# Patient Record
Sex: FEMALE | Race: ASIAN | NOT HISPANIC OR LATINO | Employment: FULL TIME | URBAN - METROPOLITAN AREA
[De-identification: names, ages, dates, MRNs, and addresses within clinical notes are randomized per-mention and may not be internally consistent; named-entity substitution may affect disease eponyms.]

---

## 2023-05-23 ENCOUNTER — OFFICE VISIT (OUTPATIENT)
Dept: PRIMARY CARE | Facility: CLINIC | Age: 28
End: 2023-05-23
Payer: COMMERCIAL

## 2023-05-23 VITALS
WEIGHT: 205 LBS | SYSTOLIC BLOOD PRESSURE: 130 MMHG | DIASTOLIC BLOOD PRESSURE: 82 MMHG | HEART RATE: 115 BPM | OXYGEN SATURATION: 98 %

## 2023-05-23 DIAGNOSIS — M25.59 PAIN IN OTHER JOINT: Primary | ICD-10-CM

## 2023-05-23 DIAGNOSIS — Z13.29 THYROID DISORDER SCREENING: ICD-10-CM

## 2023-05-23 DIAGNOSIS — J45.20 MILD INTERMITTENT EXTRINSIC ASTHMA WITHOUT COMPLICATION (HHS-HCC): ICD-10-CM

## 2023-05-23 DIAGNOSIS — F41.9 ANXIETY AND DEPRESSION: ICD-10-CM

## 2023-05-23 DIAGNOSIS — R73.9 HYPERGLYCEMIA: ICD-10-CM

## 2023-05-23 DIAGNOSIS — Z00.00 HEALTH CARE MAINTENANCE: ICD-10-CM

## 2023-05-23 DIAGNOSIS — F32.A ANXIETY AND DEPRESSION: ICD-10-CM

## 2023-05-23 DIAGNOSIS — F90.0 ATTENTION DEFICIT HYPERACTIVITY DISORDER (ADHD), PREDOMINANTLY INATTENTIVE TYPE: ICD-10-CM

## 2023-05-23 DIAGNOSIS — Z23 ENCOUNTER FOR IMMUNIZATION: ICD-10-CM

## 2023-05-23 DIAGNOSIS — M79.10 MYALGIA: ICD-10-CM

## 2023-05-23 LAB
ALANINE AMINOTRANSFERASE (SGPT) (U/L) IN SER/PLAS: 22 U/L (ref 7–45)
ALBUMIN (G/DL) IN SER/PLAS: 4.6 G/DL (ref 3.4–5)
ALKALINE PHOSPHATASE (U/L) IN SER/PLAS: 45 U/L (ref 33–110)
ANION GAP IN SER/PLAS: 15 MMOL/L (ref 10–20)
ASPARTATE AMINOTRANSFERASE (SGOT) (U/L) IN SER/PLAS: 17 U/L (ref 9–39)
BILIRUBIN TOTAL (MG/DL) IN SER/PLAS: 0.2 MG/DL (ref 0–1.2)
C REACTIVE PROTEIN (MG/L) IN SER/PLAS: 0.29 MG/DL
CALCIUM (MG/DL) IN SER/PLAS: 9.7 MG/DL (ref 8.6–10.6)
CARBON DIOXIDE, TOTAL (MMOL/L) IN SER/PLAS: 26 MMOL/L (ref 21–32)
CHLORIDE (MMOL/L) IN SER/PLAS: 101 MMOL/L (ref 98–107)
CREATININE (MG/DL) IN SER/PLAS: 0.71 MG/DL (ref 0.5–1.05)
ESTIMATED AVERAGE GLUCOSE FOR HBA1C: 105 MG/DL
GFR FEMALE: >90 ML/MIN/1.73M2
GLUCOSE (MG/DL) IN SER/PLAS: 116 MG/DL (ref 74–99)
HEMOGLOBIN A1C/HEMOGLOBIN TOTAL IN BLOOD: 5.3 %
IRON (UG/DL) IN SER/PLAS: 47 UG/DL (ref 35–150)
IRON BINDING CAPACITY (UG/DL) IN SER/PLAS: 476 UG/DL (ref 240–445)
IRON SATURATION (%) IN SER/PLAS: 10 % (ref 25–45)
POTASSIUM (MMOL/L) IN SER/PLAS: 4 MMOL/L (ref 3.5–5.3)
PROTEIN TOTAL: 7.8 G/DL (ref 6.4–8.2)
SODIUM (MMOL/L) IN SER/PLAS: 138 MMOL/L (ref 136–145)
THYROTROPIN (MIU/L) IN SER/PLAS BY DETECTION LIMIT <= 0.05 MIU/L: 2.13 MIU/L (ref 0.44–3.98)
UREA NITROGEN (MG/DL) IN SER/PLAS: 12 MG/DL (ref 6–23)

## 2023-05-23 PROCEDURE — 1036F TOBACCO NON-USER: CPT | Performed by: STUDENT IN AN ORGANIZED HEALTH CARE EDUCATION/TRAINING PROGRAM

## 2023-05-23 PROCEDURE — 83540 ASSAY OF IRON: CPT

## 2023-05-23 PROCEDURE — 84443 ASSAY THYROID STIM HORMONE: CPT

## 2023-05-23 PROCEDURE — 85027 COMPLETE CBC AUTOMATED: CPT

## 2023-05-23 PROCEDURE — 86038 ANTINUCLEAR ANTIBODIES: CPT

## 2023-05-23 PROCEDURE — 90471 IMMUNIZATION ADMIN: CPT | Performed by: STUDENT IN AN ORGANIZED HEALTH CARE EDUCATION/TRAINING PROGRAM

## 2023-05-23 PROCEDURE — 99204 OFFICE O/P NEW MOD 45 MIN: CPT | Performed by: STUDENT IN AN ORGANIZED HEALTH CARE EDUCATION/TRAINING PROGRAM

## 2023-05-23 PROCEDURE — 85652 RBC SED RATE AUTOMATED: CPT

## 2023-05-23 PROCEDURE — 80053 COMPREHEN METABOLIC PANEL: CPT

## 2023-05-23 PROCEDURE — 83036 HEMOGLOBIN GLYCOSYLATED A1C: CPT

## 2023-05-23 PROCEDURE — 90715 TDAP VACCINE 7 YRS/> IM: CPT | Performed by: STUDENT IN AN ORGANIZED HEALTH CARE EDUCATION/TRAINING PROGRAM

## 2023-05-23 PROCEDURE — 83550 IRON BINDING TEST: CPT

## 2023-05-23 PROCEDURE — 86140 C-REACTIVE PROTEIN: CPT

## 2023-05-23 RX ORDER — HYDROXYZINE PAMOATE 25 MG/1
CAPSULE ORAL
COMMUNITY
Start: 2023-04-30 | End: 2023-06-07 | Stop reason: SDUPTHER

## 2023-05-23 RX ORDER — SERTRALINE HYDROCHLORIDE 100 MG/1
100 TABLET, FILM COATED ORAL DAILY
COMMUNITY
Start: 2023-04-30 | End: 2023-06-07 | Stop reason: SDUPTHER

## 2023-05-23 RX ORDER — DEXTROAMPHETAMINE SACCHARATE, AMPHETAMINE ASPARTATE, DEXTROAMPHETAMINE SULFATE AND AMPHETAMINE SULFATE 5; 5; 5; 5 MG/1; MG/1; MG/1; MG/1
20 TABLET ORAL 2 TIMES DAILY
COMMUNITY
Start: 2023-05-05 | End: 2023-06-01 | Stop reason: SDUPTHER

## 2023-05-23 RX ORDER — ALBUTEROL SULFATE 90 UG/1
AEROSOL, METERED RESPIRATORY (INHALATION)
COMMUNITY

## 2023-05-23 RX ORDER — MONTELUKAST SODIUM 10 MG/1
10 TABLET ORAL NIGHTLY
Qty: 30 TABLET | Refills: 5 | Status: SHIPPED | OUTPATIENT
Start: 2023-05-23 | End: 2023-11-28 | Stop reason: SDUPTHER

## 2023-05-23 ASSESSMENT — PATIENT HEALTH QUESTIONNAIRE - PHQ9
SUM OF ALL RESPONSES TO PHQ9 QUESTIONS 1 AND 2: 0
2. FEELING DOWN, DEPRESSED OR HOPELESS: NOT AT ALL
1. LITTLE INTEREST OR PLEASURE IN DOING THINGS: NOT AT ALL

## 2023-05-23 NOTE — PROGRESS NOTES
Subjective   Patient ID: Ania Claire is a 27 y.o. female who presents for Establish Care (New patient est care/Muscle/joint pains, states she is asthmatic/Remembers that when she was a child she was told she had mild scoliosis).  Establish care.     Recently diagnosed with ADHD. Had symptoms throughout childhood but was not previously tested. Started adderall in January, has been doing very well with this. Takes 20mg twice daily. Sweats more but no other side effects. Says this is tolerable.      Generalized muscle and body aches. Has been going on for 9-10 years. Slightly worse. Sleep and travel worsen this. No prior work up for this.     Back and shoulder muscles. Hot baths and biofreeze help.     Has Mirena IUD, has significantly improved her cramps and bleeding. 2021 normal pap.     Normal STI testing one month ago. Gets screened regularly.     No significant family hx of rheumatologist disease.     Long hx of poor sleep.     Was previously on lexapro. Did not help her anxiety. Now is on zoloft 100mg. Anxiety is still moderate.     Paternal grandmother  from colon cancer. Strong family hx of mental illness.     Had PFTs 10 years ago. Uses her albuterol less than 1x per week.         Review of Systems   Constitutional:  Positive for fatigue. Negative for activity change and unexpected weight change.   Eyes:  Negative for visual disturbance.   Respiratory:  Positive for cough and wheezing.    Cardiovascular:  Negative for chest pain, palpitations and leg swelling.   Genitourinary:  Negative for menstrual problem.   Musculoskeletal:  Positive for arthralgias and myalgias.   Neurological:  Negative for dizziness, light-headedness and headaches.   Psychiatric/Behavioral:  Positive for sleep disturbance.    All other systems reviewed and are negative.      Objective   Physical Exam  Vitals reviewed.   Constitutional:       General: She is not in acute distress.  HENT:      Head: Normocephalic.       Right Ear: External ear normal.      Left Ear: External ear normal.      Nose: Nose normal.   Eyes:      Extraocular Movements: Extraocular movements intact.      Pupils: Pupils are equal, round, and reactive to light.   Cardiovascular:      Rate and Rhythm: Normal rate and regular rhythm.      Heart sounds: Normal heart sounds.   Pulmonary:      Effort: Pulmonary effort is normal.      Breath sounds: Normal breath sounds.   Musculoskeletal:      Cervical back: Normal range of motion and neck supple.   Skin:     General: Skin is warm and dry.   Neurological:      Mental Status: She is alert. Mental status is at baseline.   Psychiatric:         Mood and Affect: Mood normal.         Behavior: Behavior normal.           Current Outpatient Medications:     albuterol 90 mcg/actuation inhaler, INHALE 2 PUFFS EVERY 4 TO 6 HOURS AS NEEDED FOR SHORTNESS OF BREATH OR WHEEZING, Disp: , Rfl:     amphetamine-dextroamphetamine (Adderall) 20 mg tablet, Take 1 tablet (20 mg) by mouth 2 times a day., Disp: , Rfl:     hydrOXYzine pamoate (Vistaril) 25 mg capsule, TAKE ONE CAPSULE BY MOUTH THREE TIMES A DAY AS NEEDED FOR ANXIETY, Disp: , Rfl:     sertraline (Zoloft) 100 mg tablet, Take 1 tablet (100 mg) by mouth once daily., Disp: , Rfl:     montelukast (Singulair) 10 mg tablet, Take 1 tablet (10 mg) by mouth once daily at bedtime., Disp: 30 tablet, Rfl: 5      Assessment/Plan   Diagnoses and all orders for this visit:  Pain in other joint  -     Sedimentation Rate  -     C-reactive protein  -     JAYLON  -     Iron and TIBC  Myalgia  -     Sedimentation Rate  -     C-reactive protein  -     JAYLON  Thyroid disorder screening  -     TSH with reflex to Free T4 if abnormal  Hyperglycemia  -     Hemoglobin A1c  Health care maintenance  Comments:  up to date on paps  Orders:  -     CBC  -     Comprehensive metabolic panel  Encounter for immunization  -     Tdap vaccine, age 10 years and older (ADACEL)  Mild intermittent extrinsic asthma  without complication  Comments:  PFTs 10 years ago  rare albuterol use  add singulair due to allergy related triggers  Orders:  -     montelukast (Singulair) 10 mg tablet; Take 1 tablet (10 mg) by mouth once daily at bedtime.  Attention deficit hyperactivity disorder (ADHD), predominantly inattentive type  Comments:  doing well on 20mg adderall BID  requested psychiatry records  Anxiety and depression  Comments:  anxiety not fully controlled  depression is doing well  discussed can consider going up on zoloft dose if interested  Sees counselor every 2 weeks    Follow up in 3 months    Zarina Grigsby, DO

## 2023-05-24 PROBLEM — N92.1 BREAKTHROUGH BLEEDING WITH IUD: Status: ACTIVE | Noted: 2021-07-20

## 2023-05-24 PROBLEM — N92.0 MENORRHAGIA WITH REGULAR CYCLE: Status: ACTIVE | Noted: 2021-06-01

## 2023-05-24 PROBLEM — Z97.5 BREAKTHROUGH BLEEDING WITH IUD: Status: ACTIVE | Noted: 2021-07-20

## 2023-05-24 PROBLEM — R06.2 WHEEZING: Status: ACTIVE | Noted: 2018-07-18

## 2023-05-24 LAB
ANTI-NUCLEAR ANTIBODY (ANA): NEGATIVE
ERYTHROCYTE DISTRIBUTION WIDTH (RATIO) BY AUTOMATED COUNT: 12.8 % (ref 11.5–14.5)
ERYTHROCYTE MEAN CORPUSCULAR HEMOGLOBIN CONCENTRATION (G/DL) BY AUTOMATED: 30.6 G/DL (ref 32–36)
ERYTHROCYTE MEAN CORPUSCULAR VOLUME (FL) BY AUTOMATED COUNT: 92 FL (ref 80–100)
ERYTHROCYTES (10*6/UL) IN BLOOD BY AUTOMATED COUNT: 4.58 X10E12/L (ref 4–5.2)
HEMATOCRIT (%) IN BLOOD BY AUTOMATED COUNT: 42.1 % (ref 36–46)
HEMOGLOBIN (G/DL) IN BLOOD: 12.9 G/DL (ref 12–16)
LEUKOCYTES (10*3/UL) IN BLOOD BY AUTOMATED COUNT: 7.4 X10E9/L (ref 4.4–11.3)
NRBC (PER 100 WBCS) BY AUTOMATED COUNT: 0 /100 WBC (ref 0–0)
PLATELETS (10*3/UL) IN BLOOD AUTOMATED COUNT: 353 X10E9/L (ref 150–450)
SEDIMENTATION RATE, ERYTHROCYTE: 15 MM/H (ref 0–20)

## 2023-05-24 ASSESSMENT — ENCOUNTER SYMPTOMS
ACTIVITY CHANGE: 0
FATIGUE: 1
COUGH: 1
ARTHRALGIAS: 1
MYALGIAS: 1
UNEXPECTED WEIGHT CHANGE: 0
WHEEZING: 1
HEADACHES: 0
PALPITATIONS: 0
DIZZINESS: 0
SLEEP DISTURBANCE: 1
LIGHT-HEADEDNESS: 0

## 2023-05-31 ENCOUNTER — CLINICAL SUPPORT (OUTPATIENT)
Dept: PRIMARY CARE | Facility: CLINIC | Age: 28
End: 2023-05-31
Payer: COMMERCIAL

## 2023-05-31 DIAGNOSIS — Z91.89 COMPLIANCE WITH MEDICATION REGIMEN: ICD-10-CM

## 2023-05-31 DIAGNOSIS — Z91.158: ICD-10-CM

## 2023-05-31 PROCEDURE — 80349 CANNABINOIDS NATURAL: CPT

## 2023-05-31 PROCEDURE — 80361 OPIATES 1 OR MORE: CPT

## 2023-05-31 PROCEDURE — 80365 DRUG SCREENING OXYCODONE: CPT

## 2023-05-31 PROCEDURE — 80373 DRUG SCREENING TRAMADOL: CPT

## 2023-05-31 PROCEDURE — 80368 SEDATIVE HYPNOTICS: CPT

## 2023-05-31 PROCEDURE — 80324 DRUG SCREEN AMPHETAMINES 1/2: CPT

## 2023-05-31 PROCEDURE — 80346 BENZODIAZEPINES1-12: CPT

## 2023-05-31 PROCEDURE — 80358 DRUG SCREENING METHADONE: CPT

## 2023-05-31 PROCEDURE — 80354 DRUG SCREENING FENTANYL: CPT

## 2023-05-31 PROCEDURE — 80307 DRUG TEST PRSMV CHEM ANLYZR: CPT

## 2023-06-01 ENCOUNTER — PATIENT MESSAGE (OUTPATIENT)
Dept: PRIMARY CARE | Facility: CLINIC | Age: 28
End: 2023-06-01

## 2023-06-01 DIAGNOSIS — F90.0 ATTENTION DEFICIT HYPERACTIVITY DISORDER (ADHD), PREDOMINANTLY INATTENTIVE TYPE: Primary | ICD-10-CM

## 2023-06-01 RX ORDER — DEXTROAMPHETAMINE SACCHARATE, AMPHETAMINE ASPARTATE, DEXTROAMPHETAMINE SULFATE AND AMPHETAMINE SULFATE 5; 5; 5; 5 MG/1; MG/1; MG/1; MG/1
20 TABLET ORAL 2 TIMES DAILY
Qty: 60 TABLET | Refills: 0 | Status: SHIPPED | OUTPATIENT
Start: 2023-06-01 | End: 2023-07-13 | Stop reason: SDUPTHER

## 2023-06-02 LAB
6-ACETYLMORPHINE: <25 NG/ML
7-AMINOCLONAZEPAM: <25 NG/ML
ALPHA-HYDROXYALPRAZOLAM: <25 NG/ML
ALPHA-HYDROXYMIDAZOLAM: <25 NG/ML
ALPRAZOLAM: <25 NG/ML
AMPHETAMINE (PRESENCE) IN URINE BY SCREEN METHOD: ABNORMAL
BARBITURATES PRESENCE IN URINE BY SCREEN METHOD: ABNORMAL
CANNABINOIDS IN URINE BY SCREEN METHOD: ABNORMAL
CHLORDIAZEPOXIDE: <25 NG/ML
CLONAZEPAM: <25 NG/ML
COCAINE (PRESENCE) IN URINE BY SCREEN METHOD: ABNORMAL
CODEINE: <50 NG/ML
CREATINE, URINE FOR DRUG: 116.2 MG/DL
DIAZEPAM: <25 NG/ML
DRUG SCREEN COMMENT URINE: ABNORMAL
EDDP: <25 NG/ML
FENTANYL CONFIRMATION, URINE: <2.5 NG/ML
HYDROCODONE: <25 NG/ML
HYDROMORPHONE: <25 NG/ML
LORAZEPAM: <25 NG/ML
METHADONE CONFIRMATION,URINE: <25 NG/ML
MIDAZOLAM: <25 NG/ML
MORPHINE URINE: <50 NG/ML
NORDIAZEPAM: <25 NG/ML
NORFENTANYL: <2.5 NG/ML
NORHYDROCODONE: <25 NG/ML
NOROXYCODONE: <25 NG/ML
O-DESMETHYLTRAMADOL: <50 NG/ML
OXAZEPAM: <25 NG/ML
OXYCODONE: <25 NG/ML
OXYMORPHONE: <25 NG/ML
PHENCYCLIDINE (PRESENCE) IN URINE BY SCREEN METHOD: ABNORMAL
TEMAZEPAM: <25 NG/ML
TRAMADOL: <50 NG/ML
ZOLPIDEM METABOLITE (ZCA): <25 NG/ML
ZOLPIDEM: <25 NG/ML

## 2023-06-05 LAB
11-NOR-9-CARBOXY-THC, URN, QUANT: >500 NG/ML
AMPHETAMINES,URINE: 4429 NG/ML
MDA,URINE: <200 NG/ML
MDEA,URINE: <200 NG/ML
MDMA,UR: <200 NG/ML
METHAMPHETAMINE QUANTITATIVE URINE: <200 NG/ML
PHENTERMINE,UR: <200 NG/ML

## 2023-06-07 DIAGNOSIS — F32.A ANXIETY AND DEPRESSION: Primary | ICD-10-CM

## 2023-06-07 DIAGNOSIS — F41.9 ANXIETY AND DEPRESSION: Primary | ICD-10-CM

## 2023-06-07 RX ORDER — SERTRALINE HYDROCHLORIDE 100 MG/1
100 TABLET, FILM COATED ORAL DAILY
Qty: 90 TABLET | Refills: 3 | Status: SHIPPED | OUTPATIENT
Start: 2023-06-07 | End: 2023-07-05 | Stop reason: SDUPTHER

## 2023-06-07 RX ORDER — HYDROXYZINE PAMOATE 25 MG/1
CAPSULE ORAL
Qty: 30 CAPSULE | Refills: 2 | Status: SHIPPED | OUTPATIENT
Start: 2023-06-07 | End: 2023-06-08 | Stop reason: SDUPTHER

## 2023-06-08 DIAGNOSIS — F32.A ANXIETY AND DEPRESSION: ICD-10-CM

## 2023-06-08 DIAGNOSIS — F41.9 ANXIETY AND DEPRESSION: ICD-10-CM

## 2023-06-08 RX ORDER — HYDROXYZINE PAMOATE 25 MG/1
CAPSULE ORAL
Qty: 90 CAPSULE | Refills: 11 | Status: SHIPPED
Start: 2023-06-08 | End: 2024-01-05 | Stop reason: SDUPTHER

## 2023-06-18 ENCOUNTER — PATIENT MESSAGE (OUTPATIENT)
Dept: PRIMARY CARE | Facility: CLINIC | Age: 28
End: 2023-06-18

## 2023-06-18 DIAGNOSIS — M79.10 MYALGIA: Primary | ICD-10-CM

## 2023-06-18 DIAGNOSIS — M25.59 PAIN IN OTHER JOINT: ICD-10-CM

## 2023-06-18 DIAGNOSIS — M24.9 HYPERMOBILITY OF JOINT: ICD-10-CM

## 2023-06-18 DIAGNOSIS — R53.83 OTHER FATIGUE: ICD-10-CM

## 2023-06-19 ENCOUNTER — TELEMEDICINE (OUTPATIENT)
Dept: PRIMARY CARE | Facility: CLINIC | Age: 28
End: 2023-06-19
Payer: COMMERCIAL

## 2023-06-19 DIAGNOSIS — J02.8 SORE THROAT (VIRAL): Primary | ICD-10-CM

## 2023-06-19 DIAGNOSIS — B97.89 SORE THROAT (VIRAL): Primary | ICD-10-CM

## 2023-06-19 PROCEDURE — 99443 PR PHYS/QHP TELEPHONE EVALUATION 21-30 MIN: CPT | Performed by: NURSE PRACTITIONER

## 2023-06-19 ASSESSMENT — ENCOUNTER SYMPTOMS
CONSTIPATION: 0
WEAKNESS: 0
PALPITATIONS: 0
HEADACHES: 0
FEVER: 0
DIZZINESS: 0
NAUSEA: 0
VOMITING: 0
FATIGUE: 0
ABDOMINAL PAIN: 0
PSYCHIATRIC NEGATIVE: 1
COUGH: 0
SORE THROAT: 1
SHORTNESS OF BREATH: 0
NUMBNESS: 0
CHILLS: 0
MUSCULOSKELETAL NEGATIVE: 1
DIARRHEA: 0

## 2023-06-19 NOTE — PROGRESS NOTES
"Subjective   Patient ID: Ania Claire is a 27 y.o. female who presents for mild sore throat.    HPI   Patient states she started having a scratchy throat the day before yesterday.  Now has a little bit of a raw throat today. Can feel one tonsil is swollen.  She can swallow without issue.  Denies fever.  Denies any sinus pain or congestion.  States that this always turns into an infection and she wanted to catch it before it got bad.  She does have asthma and uses her albuterol inhaler.  Has not felt she has needed it.    Review of Systems   Constitutional:  Negative for chills, fatigue and fever.   HENT:  Positive for sore throat. Negative for congestion and ear pain.    Eyes:  Negative for visual disturbance.   Respiratory:  Negative for cough and shortness of breath.    Cardiovascular:  Negative for chest pain, palpitations and leg swelling.   Gastrointestinal:  Negative for abdominal pain, constipation, diarrhea, nausea and vomiting.   Genitourinary: Negative.    Musculoskeletal: Negative.    Skin:  Negative for rash.   Neurological:  Negative for dizziness, weakness, numbness and headaches.   Psychiatric/Behavioral: Negative.         Objective   There were no vitals taken for this visit.    Physical Exam  Constitutional:       Appearance: Normal appearance.   Pulmonary:      Effort: Pulmonary effort is normal.   Neurological:      Mental Status: She is alert.   Psychiatric:         Mood and Affect: Mood normal.         Behavior: Behavior normal.         Thought Content: Thought content normal.         Assessment/Plan   Problem List Items Addressed This Visit       Sore throat (viral) - Primary     Advised patient to go get strep test at  outpatient lab.  Stated she would not go do that because she works from home and does not know the area well.  She would just \"ride it out\".  Instructed her to return to a virtual visit if her symptoms worsened or she would obtain a strep test.  Instructed also to use " Flonase and albuterol inhaler as needed

## 2023-06-19 NOTE — ASSESSMENT & PLAN NOTE
"Advised patient to go get strep test at  outpatient lab.  Stated she would not go do that because she works from home and does not know the area well.  She would just \"ride it out\".  Instructed her to return to a virtual visit if her symptoms worsened or she would obtain a strep test.  Instructed also to use Flonase and albuterol inhaler as needed  "

## 2023-07-05 ENCOUNTER — PATIENT MESSAGE (OUTPATIENT)
Dept: PRIMARY CARE | Facility: CLINIC | Age: 28
End: 2023-07-05

## 2023-07-05 DIAGNOSIS — F41.9 ANXIETY AND DEPRESSION: ICD-10-CM

## 2023-07-05 DIAGNOSIS — F32.A ANXIETY AND DEPRESSION: ICD-10-CM

## 2023-07-05 RX ORDER — SERTRALINE HYDROCHLORIDE 100 MG/1
150 TABLET, FILM COATED ORAL DAILY
Qty: 45 TABLET | Refills: 1 | Status: SHIPPED | OUTPATIENT
Start: 2023-07-05 | End: 2023-08-22 | Stop reason: SDUPTHER

## 2023-07-12 ENCOUNTER — PATIENT MESSAGE (OUTPATIENT)
Dept: PRIMARY CARE | Facility: CLINIC | Age: 28
End: 2023-07-12

## 2023-07-12 DIAGNOSIS — F90.0 ATTENTION DEFICIT HYPERACTIVITY DISORDER (ADHD), PREDOMINANTLY INATTENTIVE TYPE: ICD-10-CM

## 2023-07-13 RX ORDER — DEXTROAMPHETAMINE SACCHARATE, AMPHETAMINE ASPARTATE, DEXTROAMPHETAMINE SULFATE AND AMPHETAMINE SULFATE 5; 5; 5; 5 MG/1; MG/1; MG/1; MG/1
20 TABLET ORAL 2 TIMES DAILY
Qty: 60 TABLET | Refills: 0 | Status: SHIPPED | OUTPATIENT
Start: 2023-07-13 | End: 2023-08-22 | Stop reason: SDUPTHER

## 2023-07-13 NOTE — TELEPHONE ENCOUNTER
From: Elsy Claire  To: Zarina Grigsby DO  Sent: 7/12/2023 9:52 PM EDT  Subject: Adderall     Hi there,    Could I have a refill of my adderall sent to the [facility] pharmacy please?

## 2023-07-18 LAB
ALANINE AMINOTRANSFERASE (SGPT) (U/L) IN SER/PLAS: 28 U/L (ref 7–45)
ALBUMIN (G/DL) IN SER/PLAS: 4.7 G/DL (ref 3.4–5)
ALKALINE PHOSPHATASE (U/L) IN SER/PLAS: 48 U/L (ref 33–110)
ASPARTATE AMINOTRANSFERASE (SGOT) (U/L) IN SER/PLAS: 23 U/L (ref 9–39)
BILIRUBIN DIRECT (MG/DL) IN SER/PLAS: 0 MG/DL (ref 0–0.3)
BILIRUBIN TOTAL (MG/DL) IN SER/PLAS: 0.3 MG/DL (ref 0–1.2)
CREATINE KINASE (U/L) IN SER/PLAS: 63 U/L (ref 0–215)
PROTEIN TOTAL: 8.2 G/DL (ref 6.4–8.2)

## 2023-07-19 LAB
ANTI-CENTROMERE: <0.2 AI
ANTI-CHROMATIN: <0.2 AI
ANTI-DNA (DS): <1 IU/ML
ANTI-JO-1 IGG: <0.2 AI
ANTI-NUCLEAR ANTIBODY (ANA): NEGATIVE
ANTI-RIBOSOMAL P: <0.2 AI
ANTI-RNP: <0.2 AI
ANTI-SCL-70: <0.2 AI
ANTI-SM/RNP: <0.2 AI
ANTI-SM: <0.2 AI
ANTI-SSA: 0.2 AI
ANTI-SSB: <0.2 AI
CITRULLINE ANTIBODY, IGG: <1 U/ML

## 2023-07-20 LAB — ALDOLASE SERUM: 2.6 U/L (ref 1.2–7.6)

## 2023-08-08 ENCOUNTER — APPOINTMENT (OUTPATIENT)
Dept: PRIMARY CARE | Facility: CLINIC | Age: 28
End: 2023-08-08

## 2023-09-08 ENCOUNTER — PATIENT MESSAGE (OUTPATIENT)
Dept: PRIMARY CARE | Facility: CLINIC | Age: 28
End: 2023-09-08

## 2023-09-08 DIAGNOSIS — F32.A ANXIETY AND DEPRESSION: Primary | ICD-10-CM

## 2023-09-08 DIAGNOSIS — F41.9 ANXIETY AND DEPRESSION: Primary | ICD-10-CM

## 2023-09-11 RX ORDER — BUSPIRONE HYDROCHLORIDE 5 MG/1
5-10 TABLET ORAL 2 TIMES DAILY PRN
Qty: 60 TABLET | Refills: 2 | Status: SHIPPED
Start: 2023-09-11 | End: 2023-09-26 | Stop reason: SDUPTHER

## 2023-09-11 RX ORDER — BUSPIRONE HYDROCHLORIDE 5 MG/1
5-10 TABLET ORAL 2 TIMES DAILY PRN
Qty: 60 TABLET | Refills: 2 | Status: SHIPPED | OUTPATIENT
Start: 2023-09-11 | End: 2023-09-11 | Stop reason: SDUPTHER

## 2023-09-19 ENCOUNTER — APPOINTMENT (OUTPATIENT)
Dept: PRIMARY CARE | Facility: CLINIC | Age: 28
End: 2023-09-19

## 2023-10-17 ENCOUNTER — OFFICE VISIT (OUTPATIENT)
Dept: PRIMARY CARE | Facility: CLINIC | Age: 28
End: 2023-10-17
Payer: COMMERCIAL

## 2023-10-17 VITALS
HEIGHT: 67 IN | DIASTOLIC BLOOD PRESSURE: 90 MMHG | HEART RATE: 108 BPM | SYSTOLIC BLOOD PRESSURE: 132 MMHG | BODY MASS INDEX: 32.18 KG/M2 | WEIGHT: 205 LBS | OXYGEN SATURATION: 99 %

## 2023-10-17 DIAGNOSIS — F41.9 ANXIETY AND DEPRESSION: ICD-10-CM

## 2023-10-17 DIAGNOSIS — F51.01 PRIMARY INSOMNIA: ICD-10-CM

## 2023-10-17 DIAGNOSIS — F32.A ANXIETY AND DEPRESSION: ICD-10-CM

## 2023-10-17 DIAGNOSIS — F90.0 ATTENTION DEFICIT HYPERACTIVITY DISORDER (ADHD), PREDOMINANTLY INATTENTIVE TYPE: Primary | ICD-10-CM

## 2023-10-17 PROCEDURE — 1036F TOBACCO NON-USER: CPT | Performed by: STUDENT IN AN ORGANIZED HEALTH CARE EDUCATION/TRAINING PROGRAM

## 2023-10-17 PROCEDURE — 99213 OFFICE O/P EST LOW 20 MIN: CPT | Performed by: STUDENT IN AN ORGANIZED HEALTH CARE EDUCATION/TRAINING PROGRAM

## 2023-10-17 RX ORDER — BUSPIRONE HYDROCHLORIDE 7.5 MG/1
7.5-15 TABLET ORAL 2 TIMES DAILY PRN
Qty: 90 TABLET | Refills: 2 | Status: SHIPPED
Start: 2023-10-17 | End: 2024-01-05 | Stop reason: SDUPTHER

## 2023-10-17 RX ORDER — TRAZODONE HYDROCHLORIDE 50 MG/1
50 TABLET ORAL NIGHTLY PRN
Qty: 30 TABLET | Refills: 3 | Status: SHIPPED | OUTPATIENT
Start: 2023-10-17 | End: 2024-01-19

## 2023-10-17 RX ORDER — DEXTROAMPHETAMINE SACCHARATE, AMPHETAMINE ASPARTATE MONOHYDRATE, DEXTROAMPHETAMINE SULFATE AND AMPHETAMINE SULFATE 6.25; 6.25; 6.25; 6.25 MG/1; MG/1; MG/1; MG/1
25 CAPSULE, EXTENDED RELEASE ORAL EVERY MORNING
Qty: 30 CAPSULE | Refills: 0 | Status: SHIPPED | OUTPATIENT
Start: 2023-10-17 | End: 2023-11-13 | Stop reason: SDUPTHER

## 2023-10-17 ASSESSMENT — PATIENT HEALTH QUESTIONNAIRE - PHQ9
2. FEELING DOWN, DEPRESSED OR HOPELESS: NOT AT ALL
SUM OF ALL RESPONSES TO PHQ9 QUESTIONS 1 AND 2: 0
1. LITTLE INTEREST OR PLEASURE IN DOING THINGS: NOT AT ALL

## 2023-10-17 NOTE — PROGRESS NOTES
Subjective   Patient ID: Ania Claire is a 28 y.o. female who presents for Follow-up (Discuss medication adjustments - possible increase in dosage and wondering about going to XR as she forgets to take 2nd dose a lot of the time).  Wants to try extended release adderall. Forgets to take her afternoon dose about half the time. No side effects from it.     Buspar is helping her anxiety. Taking 15mg in the morning and 7.5mg in the evening. Following with her therapist regularly. This is helping.     [facility]     Saw rheumatologist, did not find this helpful. Confirmed hypermobility. Other lab testing was normal. She plans to ask her gyn at next visit about possible endocrine testing.     No other concerns today.         Review of Systems   Constitutional: Negative.    Respiratory: Negative.     Cardiovascular: Negative.    Gastrointestinal: Negative.    Genitourinary: Negative.    Musculoskeletal: Negative.    Neurological: Negative.    Psychiatric/Behavioral:  Positive for decreased concentration and sleep disturbance. The patient is nervous/anxious.    All other systems reviewed and are negative.      Objective   Physical Exam  Vitals reviewed.   Constitutional:       Appearance: Normal appearance.   HENT:      Head: Normocephalic.      Mouth/Throat:      Mouth: Mucous membranes are moist.   Eyes:      Pupils: Pupils are equal, round, and reactive to light.   Cardiovascular:      Rate and Rhythm: Normal rate and regular rhythm.   Pulmonary:      Effort: Pulmonary effort is normal. No respiratory distress.      Breath sounds: Normal breath sounds. No wheezing.   Musculoskeletal:         General: Normal range of motion.   Skin:     General: Skin is warm and dry.   Neurological:      General: No focal deficit present.      Mental Status: She is alert. Mental status is at baseline.   Psychiatric:         Mood and Affect: Mood normal.         Behavior: Behavior normal.          Body mass index is 32.11 kg/m².      Current Outpatient Medications:     albuterol 90 mcg/actuation inhaler, INHALE 2 PUFFS EVERY 4 TO 6 HOURS AS NEEDED FOR SHORTNESS OF BREATH OR WHEEZING, Disp: , Rfl:     hydrOXYzine pamoate (Vistaril) 25 mg capsule, TAKE ONE CAPSULE BY MOUTH THREE TIMES A DAY AS NEEDED FOR ANXIETY, Disp: 90 capsule, Rfl: 11    montelukast (Singulair) 10 mg tablet, Take 1 tablet (10 mg) by mouth once daily at bedtime., Disp: 30 tablet, Rfl: 5    sertraline (Zoloft) 100 mg tablet, Take 2 tablets (200 mg) by mouth once daily., Disp: 60 tablet, Rfl: 1    amphetamine-dextroamphetamine XR (Adderall XR) 25 mg 24 hr capsule, Take 1 capsule (25 mg) by mouth once daily in the morning. Do not crush or chew., Disp: 30 capsule, Rfl: 0    busPIRone (Buspar) 7.5 mg tablet, Take 1-2 tablets (7.5-15 mg) by mouth 2 times a day as needed (anxiety)., Disp: 90 tablet, Rfl: 2    traZODone (Desyrel) 50 mg tablet, Take 1 tablet (50 mg) by mouth as needed at bedtime for sleep., Disp: 30 tablet, Rfl: 3      Assessment/Plan   Diagnoses and all orders for this visit:  Attention deficit hyperactivity disorder (ADHD), predominantly inattentive type  Comments:  CSA up to date  change to once daily long acting adderall  reassess  discussed other long acting options if needed  Orders:  -     amphetamine-dextroamphetamine XR (Adderall XR) 25 mg 24 hr capsule; Take 1 capsule (25 mg) by mouth once daily in the morning. Do not crush or chew.  Anxiety and depression  Comments:  doing better  seeing therapist regularly  Orders:  -     busPIRone (Buspar) 7.5 mg tablet; Take 1-2 tablets (7.5-15 mg) by mouth 2 times a day as needed (anxiety).  Primary insomnia  -     traZODone (Desyrel) 50 mg tablet; Take 1 tablet (50 mg) by mouth as needed at bedtime for sleep.    OARRS report reviewed for Elsy Claire today and is consistent with prescribed therapy.  We weighed the risks and benefit of this therapy and will continue  with the current plan    Follow up in 3 months    Zarina Grigsby, DO

## 2023-10-18 ASSESSMENT — ENCOUNTER SYMPTOMS
DECREASED CONCENTRATION: 1
MUSCULOSKELETAL NEGATIVE: 1
GASTROINTESTINAL NEGATIVE: 1
NERVOUS/ANXIOUS: 1
SLEEP DISTURBANCE: 1
CONSTITUTIONAL NEGATIVE: 1
RESPIRATORY NEGATIVE: 1
NEUROLOGICAL NEGATIVE: 1
CARDIOVASCULAR NEGATIVE: 1

## 2023-11-03 DIAGNOSIS — F41.9 ANXIETY AND DEPRESSION: ICD-10-CM

## 2023-11-03 DIAGNOSIS — F32.A ANXIETY AND DEPRESSION: ICD-10-CM

## 2023-11-03 RX ORDER — SERTRALINE HYDROCHLORIDE 100 MG/1
200 TABLET, FILM COATED ORAL DAILY
Qty: 60 TABLET | Refills: 1 | Status: SHIPPED | OUTPATIENT
Start: 2023-11-03 | End: 2023-12-12

## 2023-11-10 ENCOUNTER — PATIENT MESSAGE (OUTPATIENT)
Dept: PRIMARY CARE | Facility: CLINIC | Age: 28
End: 2023-11-10

## 2023-11-10 DIAGNOSIS — M25.59 PAIN IN OTHER JOINT: ICD-10-CM

## 2023-11-10 DIAGNOSIS — M24.9 HYPERMOBILITY OF JOINT: Primary | ICD-10-CM

## 2023-11-10 DIAGNOSIS — M79.10 MYALGIA: ICD-10-CM

## 2023-11-27 DIAGNOSIS — J45.20 MILD INTERMITTENT EXTRINSIC ASTHMA WITHOUT COMPLICATION (HHS-HCC): ICD-10-CM

## 2023-11-28 RX ORDER — MONTELUKAST SODIUM 10 MG/1
10 TABLET ORAL NIGHTLY
Qty: 30 TABLET | Refills: 5 | Status: SHIPPED
Start: 2023-11-28 | End: 2024-01-05 | Stop reason: SDUPTHER

## 2023-12-12 DIAGNOSIS — F32.A ANXIETY AND DEPRESSION: ICD-10-CM

## 2023-12-12 DIAGNOSIS — F41.9 ANXIETY AND DEPRESSION: ICD-10-CM

## 2023-12-12 RX ORDER — SERTRALINE HYDROCHLORIDE 100 MG/1
150 TABLET, FILM COATED ORAL DAILY
Qty: 45 TABLET | Refills: 2 | Status: SHIPPED | OUTPATIENT
Start: 2023-12-12 | End: 2024-01-05 | Stop reason: SDUPTHER

## 2023-12-30 ENCOUNTER — PATIENT MESSAGE (OUTPATIENT)
Dept: PRIMARY CARE | Facility: CLINIC | Age: 28
End: 2023-12-30

## 2023-12-30 DIAGNOSIS — F32.A ANXIETY AND DEPRESSION: ICD-10-CM

## 2023-12-30 DIAGNOSIS — F90.0 ATTENTION DEFICIT HYPERACTIVITY DISORDER (ADHD), PREDOMINANTLY INATTENTIVE TYPE: ICD-10-CM

## 2023-12-30 DIAGNOSIS — F41.9 ANXIETY AND DEPRESSION: ICD-10-CM

## 2023-12-30 DIAGNOSIS — J45.20 MILD INTERMITTENT EXTRINSIC ASTHMA WITHOUT COMPLICATION (HHS-HCC): ICD-10-CM

## 2024-01-05 RX ORDER — DEXTROAMPHETAMINE SACCHARATE, AMPHETAMINE ASPARTATE MONOHYDRATE, DEXTROAMPHETAMINE SULFATE AND AMPHETAMINE SULFATE 6.25; 6.25; 6.25; 6.25 MG/1; MG/1; MG/1; MG/1
25 CAPSULE, EXTENDED RELEASE ORAL EVERY MORNING
Qty: 30 CAPSULE | Refills: 0 | Status: SHIPPED | OUTPATIENT
Start: 2024-01-05 | End: 2024-02-14 | Stop reason: SDUPTHER

## 2024-01-05 RX ORDER — SERTRALINE HYDROCHLORIDE 100 MG/1
150 TABLET, FILM COATED ORAL DAILY
Qty: 45 TABLET | Refills: 2 | Status: SHIPPED | OUTPATIENT
Start: 2024-01-05 | End: 2024-05-28

## 2024-01-05 RX ORDER — BUSPIRONE HYDROCHLORIDE 7.5 MG/1
7.5-15 TABLET ORAL 2 TIMES DAILY PRN
Qty: 90 TABLET | Refills: 2 | Status: SHIPPED | OUTPATIENT
Start: 2024-01-05 | End: 2024-03-21 | Stop reason: SDUPTHER

## 2024-01-05 RX ORDER — HYDROXYZINE PAMOATE 25 MG/1
CAPSULE ORAL
Qty: 90 CAPSULE | Refills: 2 | Status: SHIPPED | OUTPATIENT
Start: 2024-01-05

## 2024-01-05 RX ORDER — MONTELUKAST SODIUM 10 MG/1
10 TABLET ORAL NIGHTLY
Qty: 90 TABLET | Refills: 1 | Status: SHIPPED | OUTPATIENT
Start: 2024-01-05 | End: 2024-07-03

## 2024-01-19 DIAGNOSIS — F51.01 PRIMARY INSOMNIA: ICD-10-CM

## 2024-01-19 RX ORDER — TRAZODONE HYDROCHLORIDE 50 MG/1
TABLET ORAL
Qty: 30 TABLET | Refills: 0 | Status: SHIPPED | OUTPATIENT
Start: 2024-01-19 | End: 2024-02-12

## 2024-01-22 ENCOUNTER — APPOINTMENT (OUTPATIENT)
Dept: PRIMARY CARE | Facility: CLINIC | Age: 29
End: 2024-01-22

## 2024-02-12 DIAGNOSIS — F51.01 PRIMARY INSOMNIA: ICD-10-CM

## 2024-02-12 RX ORDER — TRAZODONE HYDROCHLORIDE 50 MG/1
TABLET ORAL
Qty: 30 TABLET | Refills: 1 | Status: SHIPPED | OUTPATIENT
Start: 2024-02-12 | End: 2024-03-18

## 2024-02-14 ENCOUNTER — OFFICE VISIT (OUTPATIENT)
Dept: PRIMARY CARE | Facility: CLINIC | Age: 29
End: 2024-02-14

## 2024-02-14 VITALS
OXYGEN SATURATION: 98 % | DIASTOLIC BLOOD PRESSURE: 78 MMHG | WEIGHT: 219 LBS | BODY MASS INDEX: 34.37 KG/M2 | SYSTOLIC BLOOD PRESSURE: 120 MMHG | HEART RATE: 127 BPM | HEIGHT: 67 IN

## 2024-02-14 DIAGNOSIS — F41.9 ANXIETY AND DEPRESSION: ICD-10-CM

## 2024-02-14 DIAGNOSIS — F32.A ANXIETY AND DEPRESSION: ICD-10-CM

## 2024-02-14 DIAGNOSIS — F90.0 ATTENTION DEFICIT HYPERACTIVITY DISORDER (ADHD), PREDOMINANTLY INATTENTIVE TYPE: Primary | ICD-10-CM

## 2024-02-14 PROCEDURE — 1036F TOBACCO NON-USER: CPT | Performed by: STUDENT IN AN ORGANIZED HEALTH CARE EDUCATION/TRAINING PROGRAM

## 2024-02-14 PROCEDURE — 99213 OFFICE O/P EST LOW 20 MIN: CPT | Performed by: STUDENT IN AN ORGANIZED HEALTH CARE EDUCATION/TRAINING PROGRAM

## 2024-02-14 RX ORDER — BUSPIRONE HYDROCHLORIDE 15 MG/1
15 TABLET ORAL DAILY
Qty: 90 TABLET | Refills: 1 | Status: SHIPPED | OUTPATIENT
Start: 2024-02-14 | End: 2024-04-10 | Stop reason: SDUPTHER

## 2024-02-14 RX ORDER — DEXTROAMPHETAMINE SACCHARATE, AMPHETAMINE ASPARTATE MONOHYDRATE, DEXTROAMPHETAMINE SULFATE AND AMPHETAMINE SULFATE 6.25; 6.25; 6.25; 6.25 MG/1; MG/1; MG/1; MG/1
25 CAPSULE, EXTENDED RELEASE ORAL EVERY MORNING
Qty: 30 CAPSULE | Refills: 0 | Status: SHIPPED
Start: 2024-02-14 | End: 2024-02-29 | Stop reason: SDUPTHER

## 2024-02-14 ASSESSMENT — ENCOUNTER SYMPTOMS
CHEST TIGHTNESS: 0
ACTIVITY CHANGE: 0
HEADACHES: 0
FATIGUE: 0
GASTROINTESTINAL NEGATIVE: 1
SHORTNESS OF BREATH: 0
DYSPHORIC MOOD: 0
MUSCULOSKELETAL NEGATIVE: 1
SLEEP DISTURBANCE: 0
DIZZINESS: 0

## 2024-02-14 ASSESSMENT — PATIENT HEALTH QUESTIONNAIRE - PHQ9
10. IF YOU CHECKED OFF ANY PROBLEMS, HOW DIFFICULT HAVE THESE PROBLEMS MADE IT FOR YOU TO DO YOUR WORK, TAKE CARE OF THINGS AT HOME, OR GET ALONG WITH OTHER PEOPLE: NOT DIFFICULT AT ALL
1. LITTLE INTEREST OR PLEASURE IN DOING THINGS: NOT AT ALL
2. FEELING DOWN, DEPRESSED OR HOPELESS: SEVERAL DAYS
SUM OF ALL RESPONSES TO PHQ9 QUESTIONS 1 AND 2: 1

## 2024-02-14 NOTE — PROGRESS NOTES
"Subjective   Patient ID: Elsy Claire \"Ania\" is a 28 y.o. female who presents for Follow-up (Follow up /Medication refills ).  Quit her job in November. Feeling better mental health wise since doing this. Looking for jobs.     Adderall XR working much better for her than the short acting. Up to date on CSA.     Anxiety and depression well controlled on 150mg sertraline and 15mg buspar.     Sleeping much better with the trazodone. No hangover side effects.     Chronic elevated heart rate.     No other concerns today.                 Review of Systems   Constitutional:  Negative for activity change and fatigue.   HENT: Negative.     Eyes:  Negative for visual disturbance.   Respiratory:  Negative for chest tightness and shortness of breath.    Cardiovascular:  Negative for chest pain.   Gastrointestinal: Negative.    Musculoskeletal: Negative.    Neurological:  Negative for dizziness and headaches.   Psychiatric/Behavioral:  Negative for dysphoric mood and sleep disturbance.    All other systems reviewed and are negative.      Objective   Physical Exam  Vitals reviewed.   Constitutional:       General: She is not in acute distress.  HENT:      Head: Normocephalic.   Eyes:      Pupils: Pupils are equal, round, and reactive to light.   Cardiovascular:      Rate and Rhythm: Normal rate and regular rhythm.   Pulmonary:      Effort: Pulmonary effort is normal. No respiratory distress.   Musculoskeletal:         General: Normal range of motion.   Skin:     General: Skin is warm and dry.   Neurological:      Mental Status: She is alert. Mental status is at baseline.   Psychiatric:         Mood and Affect: Mood normal.         Behavior: Behavior normal.         Body mass index is 34.3 kg/m².      Current Outpatient Medications:     albuterol 90 mcg/actuation inhaler, INHALE 2 PUFFS EVERY 4 TO 6 HOURS AS NEEDED FOR SHORTNESS OF BREATH OR WHEEZING, Disp: , Rfl:     busPIRone (Buspar) 7.5 mg tablet, Take 1-2 tablets " (7.5-15 mg) by mouth 2 times a day as needed (anxiety). (Patient taking differently: Take 2 tablets (15 mg) by mouth 2 times a day as needed (anxiety).), Disp: 90 tablet, Rfl: 2    hydrOXYzine pamoate (Vistaril) 25 mg capsule, TAKE ONE CAPSULE BY MOUTH THREE TIMES A DAY AS NEEDED FOR ANXIETY, Disp: 90 capsule, Rfl: 2    montelukast (Singulair) 10 mg tablet, Take 1 tablet (10 mg) by mouth once daily at bedtime., Disp: 90 tablet, Rfl: 1    sertraline (Zoloft) 100 mg tablet, Take 1.5 tablets (150 mg) by mouth once daily. Take 1 and 1/2 (one and one-half) tablets by mouth daily. (Patient taking differently: Take 2 tablets (200 mg) by mouth once daily. Take 1 and 1/2 (one and one-half) tablets by mouth daily.), Disp: 45 tablet, Rfl: 2    traZODone (Desyrel) 50 mg tablet, Take 1 tablet by mouth daily at bedtime for sleep., Disp: 30 tablet, Rfl: 1    amphetamine-dextroamphetamine XR (Adderall XR) 25 mg 24 hr capsule, Take 1 capsule (25 mg) by mouth once daily in the morning. Do not crush or chew., Disp: 30 capsule, Rfl: 0    busPIRone (Buspar) 15 mg tablet, Take 1 tablet (15 mg) by mouth once daily., Disp: 90 tablet, Rfl: 1      Assessment/Plan   Diagnoses and all orders for this visit:  Attention deficit hyperactivity disorder (ADHD), predominantly inattentive type  Comments:  CSA up to date  doing much better on long acting adderall, no side effects  Orders:  -     amphetamine-dextroamphetamine XR (Adderall XR) 25 mg 24 hr capsule; Take 1 capsule (25 mg) by mouth once daily in the morning. Do not crush or chew.  Anxiety and depression  Comments:  well controlled on sertraline 150mg and buspar 15mg  Orders:  -     busPIRone (Buspar) 15 mg tablet; Take 1 tablet (15 mg) by mouth once daily.    OARRS report reviewed for Elsy Claire today and is consistent with prescribed therapy.  We weighed the risks and benefit of this therapy and will continue with the current plan      Follow up in 3 months       Zarina HAN  DO [place] 02/14/24 3:53 PM

## 2024-02-29 DIAGNOSIS — F90.0 ATTENTION DEFICIT HYPERACTIVITY DISORDER (ADHD), PREDOMINANTLY INATTENTIVE TYPE: ICD-10-CM

## 2024-02-29 RX ORDER — DEXTROAMPHETAMINE SACCHARATE, AMPHETAMINE ASPARTATE MONOHYDRATE, DEXTROAMPHETAMINE SULFATE AND AMPHETAMINE SULFATE 6.25; 6.25; 6.25; 6.25 MG/1; MG/1; MG/1; MG/1
25 CAPSULE, EXTENDED RELEASE ORAL EVERY MORNING
Qty: 30 CAPSULE | Refills: 0 | Status: SHIPPED | OUTPATIENT
Start: 2024-02-29 | End: 2024-04-08 | Stop reason: SDUPTHER

## 2024-03-17 DIAGNOSIS — F51.01 PRIMARY INSOMNIA: ICD-10-CM

## 2024-03-18 RX ORDER — TRAZODONE HYDROCHLORIDE 50 MG/1
50 TABLET ORAL NIGHTLY
Qty: 90 TABLET | Refills: 1 | Status: SHIPPED | OUTPATIENT
Start: 2024-03-18 | End: 2024-09-14

## 2024-03-20 DIAGNOSIS — F41.9 ANXIETY AND DEPRESSION: ICD-10-CM

## 2024-03-20 DIAGNOSIS — F32.A ANXIETY AND DEPRESSION: ICD-10-CM

## 2024-03-21 RX ORDER — BUSPIRONE HYDROCHLORIDE 7.5 MG/1
TABLET ORAL
Qty: 90 TABLET | Refills: 1 | Status: SHIPPED | OUTPATIENT
Start: 2024-03-21 | End: 2024-04-10 | Stop reason: WASHOUT

## 2024-03-22 ENCOUNTER — TELEPHONE (OUTPATIENT)
Dept: PRIMARY CARE | Facility: CLINIC | Age: 29
End: 2024-03-22

## 2024-03-22 NOTE — TELEPHONE ENCOUNTER
Patients pharmacy left voicemail stating they have 2 different doses of buspirone on file for patient. 7.5mg and 15mg   They need to verify max daily dose for this medication since new rx was sent in the other day

## 2024-04-06 ENCOUNTER — PATIENT MESSAGE (OUTPATIENT)
Dept: PRIMARY CARE | Facility: CLINIC | Age: 29
End: 2024-04-06

## 2024-04-06 DIAGNOSIS — F90.0 ATTENTION DEFICIT HYPERACTIVITY DISORDER (ADHD), PREDOMINANTLY INATTENTIVE TYPE: ICD-10-CM

## 2024-04-08 RX ORDER — DEXTROAMPHETAMINE SACCHARATE, AMPHETAMINE ASPARTATE MONOHYDRATE, DEXTROAMPHETAMINE SULFATE AND AMPHETAMINE SULFATE 6.25; 6.25; 6.25; 6.25 MG/1; MG/1; MG/1; MG/1
25 CAPSULE, EXTENDED RELEASE ORAL EVERY MORNING
Qty: 30 CAPSULE | Refills: 0 | Status: SHIPPED | OUTPATIENT
Start: 2024-04-08 | End: 2024-04-23 | Stop reason: ALTCHOICE

## 2024-04-10 ENCOUNTER — PATIENT MESSAGE (OUTPATIENT)
Dept: PRIMARY CARE | Facility: CLINIC | Age: 29
End: 2024-04-10

## 2024-04-10 DIAGNOSIS — F90.0 ATTENTION DEFICIT HYPERACTIVITY DISORDER (ADHD), PREDOMINANTLY INATTENTIVE TYPE: ICD-10-CM

## 2024-04-10 DIAGNOSIS — F41.9 ANXIETY AND DEPRESSION: ICD-10-CM

## 2024-04-10 DIAGNOSIS — R00.2 PALPITATIONS: Primary | ICD-10-CM

## 2024-04-10 DIAGNOSIS — F32.A ANXIETY AND DEPRESSION: ICD-10-CM

## 2024-04-10 RX ORDER — BUSPIRONE HYDROCHLORIDE 10 MG/1
20 TABLET ORAL 2 TIMES DAILY
Qty: 120 TABLET | Refills: 1 | Status: SHIPPED
Start: 2024-04-10 | End: 2024-04-10 | Stop reason: SDUPTHER

## 2024-04-10 RX ORDER — BUSPIRONE HYDROCHLORIDE 10 MG/1
20 TABLET ORAL 2 TIMES DAILY
Qty: 120 TABLET | Refills: 1 | Status: SHIPPED | OUTPATIENT
Start: 2024-04-10 | End: 2024-10-07

## 2024-04-10 RX ORDER — BUSPIRONE HYDROCHLORIDE 10 MG/1
20 TABLET ORAL 2 TIMES DAILY
Qty: 60 TABLET | Refills: 1 | Status: SHIPPED | OUTPATIENT
Start: 2024-04-10 | End: 2024-04-10 | Stop reason: SDUPTHER

## 2024-04-11 RX ORDER — PROPRANOLOL HYDROCHLORIDE 20 MG/1
20 TABLET ORAL 2 TIMES DAILY PRN
Qty: 60 TABLET | Refills: 1 | Status: SHIPPED | OUTPATIENT
Start: 2024-04-11 | End: 2024-10-08

## 2024-04-23 RX ORDER — DEXTROAMPHETAMINE SACCHARATE, AMPHETAMINE ASPARTATE MONOHYDRATE, DEXTROAMPHETAMINE SULFATE AND AMPHETAMINE SULFATE 7.5; 7.5; 7.5; 7.5 MG/1; MG/1; MG/1; MG/1
30 CAPSULE, EXTENDED RELEASE ORAL EVERY MORNING
Qty: 30 CAPSULE | Refills: 0 | Status: SHIPPED | OUTPATIENT
Start: 2024-04-23 | End: 2024-05-28 | Stop reason: SDUPTHER

## 2024-05-26 DIAGNOSIS — F32.A ANXIETY AND DEPRESSION: ICD-10-CM

## 2024-05-26 DIAGNOSIS — F41.9 ANXIETY AND DEPRESSION: ICD-10-CM

## 2024-05-28 ENCOUNTER — PATIENT MESSAGE (OUTPATIENT)
Dept: PRIMARY CARE | Facility: CLINIC | Age: 29
End: 2024-05-28

## 2024-05-28 DIAGNOSIS — F90.0 ATTENTION DEFICIT HYPERACTIVITY DISORDER (ADHD), PREDOMINANTLY INATTENTIVE TYPE: ICD-10-CM

## 2024-05-28 RX ORDER — SERTRALINE HYDROCHLORIDE 100 MG/1
200 TABLET, FILM COATED ORAL DAILY
Qty: 135 TABLET | Refills: 1 | Status: SHIPPED | OUTPATIENT
Start: 2024-05-28

## 2024-05-28 RX ORDER — DEXTROAMPHETAMINE SACCHARATE, AMPHETAMINE ASPARTATE MONOHYDRATE, DEXTROAMPHETAMINE SULFATE AND AMPHETAMINE SULFATE 7.5; 7.5; 7.5; 7.5 MG/1; MG/1; MG/1; MG/1
30 CAPSULE, EXTENDED RELEASE ORAL EVERY MORNING
Qty: 30 CAPSULE | Refills: 0 | Status: SHIPPED
Start: 2024-05-28 | End: 2024-05-28 | Stop reason: SDUPTHER

## 2024-05-28 RX ORDER — DEXTROAMPHETAMINE SACCHARATE, AMPHETAMINE ASPARTATE MONOHYDRATE, DEXTROAMPHETAMINE SULFATE AND AMPHETAMINE SULFATE 7.5; 7.5; 7.5; 7.5 MG/1; MG/1; MG/1; MG/1
30 CAPSULE, EXTENDED RELEASE ORAL EVERY MORNING
Qty: 30 CAPSULE | Refills: 0 | Status: SHIPPED | OUTPATIENT
Start: 2024-05-28 | End: 2024-06-27

## 2024-08-01 DIAGNOSIS — F41.9 ANXIETY AND DEPRESSION: ICD-10-CM

## 2024-08-01 DIAGNOSIS — F51.01 PRIMARY INSOMNIA: ICD-10-CM

## 2024-08-01 DIAGNOSIS — F32.A ANXIETY AND DEPRESSION: ICD-10-CM

## 2024-08-01 RX ORDER — HYDROXYZINE PAMOATE 25 MG/1
CAPSULE ORAL
Qty: 90 CAPSULE | Refills: 1 | Status: SHIPPED | OUTPATIENT
Start: 2024-08-01

## 2024-08-01 RX ORDER — TRAZODONE HYDROCHLORIDE 50 MG/1
50 TABLET ORAL NIGHTLY
Qty: 90 TABLET | Refills: 0 | Status: SHIPPED | OUTPATIENT
Start: 2024-08-01

## 2024-08-13 ENCOUNTER — APPOINTMENT (OUTPATIENT)
Dept: PRIMARY CARE | Facility: CLINIC | Age: 29
End: 2024-08-13
Payer: COMMERCIAL

## 2024-08-13 DIAGNOSIS — F51.01 PRIMARY INSOMNIA: ICD-10-CM

## 2024-08-13 DIAGNOSIS — R53.82 CHRONIC FATIGUE: ICD-10-CM

## 2024-08-13 DIAGNOSIS — F90.0 ATTENTION DEFICIT HYPERACTIVITY DISORDER (ADHD), PREDOMINANTLY INATTENTIVE TYPE: Primary | Chronic | ICD-10-CM

## 2024-08-13 DIAGNOSIS — Z91.89 COMPLIANCE WITH MEDICATION REGIMEN: ICD-10-CM

## 2024-08-13 DIAGNOSIS — Z51.81 MEDICATION MONITORING ENCOUNTER: ICD-10-CM

## 2024-08-13 DIAGNOSIS — F41.9 ANXIETY AND DEPRESSION: Chronic | ICD-10-CM

## 2024-08-13 DIAGNOSIS — F32.A ANXIETY AND DEPRESSION: Chronic | ICD-10-CM

## 2024-08-13 DIAGNOSIS — M24.9 HYPERMOBILITY OF JOINT: Chronic | ICD-10-CM

## 2024-08-13 PROBLEM — M24.80 GENERALIZED HYPERMOBILITY OF JOINTS: Status: ACTIVE | Noted: 2024-08-13

## 2024-08-13 PROBLEM — M25.50 POLYARTHRALGIA: Status: ACTIVE | Noted: 2024-08-13

## 2024-08-13 PROBLEM — M79.10 MYALGIA: Status: ACTIVE | Noted: 2024-08-13

## 2024-08-13 PROBLEM — J02.8 SORE THROAT (VIRAL): Status: RESOLVED | Noted: 2023-06-19 | Resolved: 2024-08-13

## 2024-08-13 PROBLEM — B97.89 SORE THROAT (VIRAL): Status: RESOLVED | Noted: 2023-06-19 | Resolved: 2024-08-13

## 2024-08-13 PROCEDURE — 99213 OFFICE O/P EST LOW 20 MIN: CPT | Performed by: STUDENT IN AN ORGANIZED HEALTH CARE EDUCATION/TRAINING PROGRAM

## 2024-08-13 PROCEDURE — 1036F TOBACCO NON-USER: CPT | Performed by: STUDENT IN AN ORGANIZED HEALTH CARE EDUCATION/TRAINING PROGRAM

## 2024-08-13 RX ORDER — SERTRALINE HYDROCHLORIDE 100 MG/1
200 TABLET, FILM COATED ORAL DAILY
Qty: 90 TABLET | Refills: 3 | Status: SHIPPED | OUTPATIENT
Start: 2024-08-13

## 2024-08-13 RX ORDER — SERTRALINE HYDROCHLORIDE 100 MG/1
150 TABLET, FILM COATED ORAL DAILY
Qty: 45 TABLET | Refills: 1 | OUTPATIENT
Start: 2024-08-13

## 2024-08-13 RX ORDER — DEXTROAMPHETAMINE SACCHARATE, AMPHETAMINE ASPARTATE MONOHYDRATE, DEXTROAMPHETAMINE SULFATE AND AMPHETAMINE SULFATE 7.5; 7.5; 7.5; 7.5 MG/1; MG/1; MG/1; MG/1
30 CAPSULE, EXTENDED RELEASE ORAL EVERY MORNING
Qty: 30 CAPSULE | Refills: 0 | Status: SHIPPED | OUTPATIENT
Start: 2024-08-13 | End: 2024-09-12

## 2024-08-13 ASSESSMENT — ENCOUNTER SYMPTOMS
SLEEP DISTURBANCE: 0
ARTHRALGIAS: 1
RESPIRATORY NEGATIVE: 1
NEUROLOGICAL NEGATIVE: 1
CARDIOVASCULAR NEGATIVE: 1
DECREASED CONCENTRATION: 0
NERVOUS/ANXIOUS: 1
MYALGIAS: 1
FATIGUE: 1

## 2024-08-13 NOTE — PROGRESS NOTES
"Subjective   Patient ID: Elsy Claire \"Ania\" is a 29 y.o. female who presents for Med Management and Anxiety.  New job, less stress.     Things have settled down mostly with her breakup. States overall feeling better.     Weaned herself off buspar, feeling well.     Propranolol working well for her for panic attacks.     Switched to zoloft at night, helping with fatigue.     Saw rheumatology and was diagnosed with hypermobility. Interested in following up with medical genetics. Wants to get documentation and definitive diagnosis if she does have EDS.     ADHD well controlled on adderall 30mg. Needs refill. Was without insurance for several months.     No other concerns today.         Review of Systems   Constitutional:  Positive for fatigue.   Respiratory: Negative.     Cardiovascular: Negative.    Musculoskeletal:  Positive for arthralgias and myalgias.   Neurological: Negative.    Psychiatric/Behavioral:  Negative for decreased concentration and sleep disturbance. The patient is nervous/anxious.    All other systems reviewed and are negative.      Objective   Complete physical exam deferred in setting of telehealth appointment    Patient sitting comfortably  No acute distress  Behavior normal      There is no height or weight on file to calculate BMI.      Current Outpatient Medications:     albuterol 90 mcg/actuation inhaler, INHALE 2 PUFFS EVERY 4 TO 6 HOURS AS NEEDED FOR SHORTNESS OF BREATH OR WHEEZING, Disp: , Rfl:     amphetamine-dextroamphetamine XR (Adderall XR) 30 mg 24 hr capsule, Take 1 capsule (30 mg) by mouth once daily in the morning. Do not crush or chew., Disp: 30 capsule, Rfl: 0    hydrOXYzine pamoate (Vistaril) 25 mg capsule, Take 1 capsule by mouth three times daily as needed for anxiety., Disp: 90 capsule, Rfl: 1    montelukast (Singulair) 10 mg tablet, Take 1 tablet (10 mg) by mouth once daily at bedtime., Disp: 90 tablet, Rfl: 1    propranolol (Inderal) 20 mg tablet, Take 1 tablet " (20 mg) by mouth 2 times a day as needed (anxiety, palpitations)., Disp: 60 tablet, Rfl: 1    sertraline (Zoloft) 100 mg tablet, Take 2 tablets (200 mg) by mouth once daily., Disp: 90 tablet, Rfl: 3    traZODone (Desyrel) 50 mg tablet, Take 1 tablet by mouth once daily at bedtime., Disp: 90 tablet, Rfl: 0      Assessment/Plan   Diagnoses and all orders for this visit:  Attention deficit hyperactivity disorder (ADHD), predominantly inattentive type  Comments:  doing well on 30mg adderall XR  update CSA at next in office visit  Orders:  -     amphetamine-dextroamphetamine XR (Adderall XR) 30 mg 24 hr capsule; Take 1 capsule (30 mg) by mouth once daily in the morning. Do not crush or chew.  Anxiety and depression  Comments:  doing better   stable on 200mg sertraline  propranolol as needed for panic attacks  Orders:  -     sertraline (Zoloft) 100 mg tablet; Take 2 tablets (200 mg) by mouth once daily.  Primary insomnia  Comments:  100mg trazodone works well for her  Chronic fatigue  -     Referral to Genetics; Future  Hypermobility of joint  Comments:  multiple EDS symptoms  interested in genetic testing to confirm diagnosis  Orders:  -     Referral to Genetics; Future  Compliance with medication regimen  Medication monitoring encounter    This visit was completed via telephone/virtual visit. All issues as documented below were discussed and addressed but no physical exam was performed. If it was felt that the patient should be evaluated via  face-to-face office appointment(s) they were directed there. The patient verbally consented to this visit.    I performed this visit using real-time telehealth tools, including an audio/video connection between Elsy Claire  and Zarina Grigsby DO [facility].    Follow up in 3 months for HEATHER Grigsby DO 08/13/24 4:40 PM

## 2024-08-19 RX ORDER — SERTRALINE HYDROCHLORIDE 100 MG/1
200 TABLET, FILM COATED ORAL DAILY
Qty: 90 TABLET | Refills: 3 | Status: SHIPPED | OUTPATIENT
Start: 2024-08-19

## 2024-09-20 DIAGNOSIS — F51.01 PRIMARY INSOMNIA: ICD-10-CM

## 2024-09-20 DIAGNOSIS — F90.0 ATTENTION DEFICIT HYPERACTIVITY DISORDER (ADHD), PREDOMINANTLY INATTENTIVE TYPE: Primary | ICD-10-CM

## 2024-09-20 RX ORDER — LISDEXAMFETAMINE DIMESYLATE 50 MG/1
50 CAPSULE ORAL EVERY MORNING
Qty: 30 CAPSULE | Refills: 0 | Status: SHIPPED | OUTPATIENT
Start: 2024-09-20 | End: 2024-10-20

## 2024-09-20 RX ORDER — TRAZODONE HYDROCHLORIDE 50 MG/1
50 TABLET ORAL NIGHTLY
Qty: 90 TABLET | Refills: 0 | Status: SHIPPED | OUTPATIENT
Start: 2024-09-20

## 2024-09-20 NOTE — PROGRESS NOTES
"Subjective   Patient ID: Elsy Claire \"Ania\" is a 29 y.o. female who presents for No chief complaint on file..  HPI    Review of Systems    Objective   Physical Exam    Assessment/Plan   {Assess/PlanSmartLinks:61973}         Zarina Grigsby, DO 09/20/24 12:16 PM   "

## 2024-09-27 DIAGNOSIS — F41.9 ANXIETY AND DEPRESSION: ICD-10-CM

## 2024-09-27 DIAGNOSIS — F32.A ANXIETY AND DEPRESSION: ICD-10-CM

## 2024-09-27 RX ORDER — HYDROXYZINE PAMOATE 25 MG/1
CAPSULE ORAL
Qty: 90 CAPSULE | Refills: 0 | Status: SHIPPED | OUTPATIENT
Start: 2024-09-27

## 2024-10-11 ENCOUNTER — PATIENT MESSAGE (OUTPATIENT)
Dept: PRIMARY CARE | Facility: CLINIC | Age: 29
End: 2024-10-11
Payer: COMMERCIAL

## 2024-10-11 DIAGNOSIS — J45.20 MILD INTERMITTENT EXTRINSIC ASTHMA WITHOUT COMPLICATION (HHS-HCC): ICD-10-CM

## 2024-10-11 DIAGNOSIS — R06.2 WHEEZING: Primary | ICD-10-CM

## 2024-10-11 RX ORDER — ALBUTEROL SULFATE 90 UG/1
2 INHALANT RESPIRATORY (INHALATION)
Qty: 18 G | Refills: 1 | Status: SHIPPED | OUTPATIENT
Start: 2024-10-11

## 2024-10-11 RX ORDER — MONTELUKAST SODIUM 10 MG/1
10 TABLET ORAL NIGHTLY
Qty: 90 TABLET | Refills: 1 | Status: SHIPPED | OUTPATIENT
Start: 2024-10-11 | End: 2025-04-09

## 2024-10-18 ENCOUNTER — PATIENT MESSAGE (OUTPATIENT)
Dept: PRIMARY CARE | Facility: CLINIC | Age: 29
End: 2024-10-18
Payer: COMMERCIAL

## 2024-10-18 DIAGNOSIS — F90.0 ATTENTION DEFICIT HYPERACTIVITY DISORDER (ADHD), PREDOMINANTLY INATTENTIVE TYPE: ICD-10-CM

## 2024-10-18 RX ORDER — LISDEXAMFETAMINE DIMESYLATE 50 MG/1
50 CAPSULE ORAL EVERY MORNING
Qty: 30 CAPSULE | Refills: 0 | Status: SHIPPED | OUTPATIENT
Start: 2024-10-18 | End: 2024-11-17

## 2024-10-22 DIAGNOSIS — F41.9 ANXIETY AND DEPRESSION: ICD-10-CM

## 2024-10-22 DIAGNOSIS — F32.A ANXIETY AND DEPRESSION: ICD-10-CM

## 2024-10-22 RX ORDER — HYDROXYZINE PAMOATE 25 MG/1
CAPSULE ORAL
Qty: 90 CAPSULE | Refills: 0 | Status: SHIPPED | OUTPATIENT
Start: 2024-10-22

## 2024-11-22 DIAGNOSIS — F90.0 ATTENTION DEFICIT HYPERACTIVITY DISORDER (ADHD), PREDOMINANTLY INATTENTIVE TYPE: ICD-10-CM

## 2024-11-22 RX ORDER — LISDEXAMFETAMINE DIMESYLATE 50 MG/1
50 CAPSULE ORAL EVERY MORNING
Qty: 30 CAPSULE | Refills: 0 | Status: SHIPPED | OUTPATIENT
Start: 2024-11-22 | End: 2024-12-22

## 2024-11-26 DIAGNOSIS — F51.01 PRIMARY INSOMNIA: ICD-10-CM

## 2024-11-26 RX ORDER — TRAZODONE HYDROCHLORIDE 50 MG/1
50 TABLET ORAL NIGHTLY
Qty: 90 TABLET | Refills: 0 | Status: SHIPPED | OUTPATIENT
Start: 2024-11-26

## 2024-11-27 DIAGNOSIS — J45.20 MILD INTERMITTENT EXTRINSIC ASTHMA WITHOUT COMPLICATION (HHS-HCC): ICD-10-CM

## 2024-11-27 RX ORDER — MONTELUKAST SODIUM 10 MG/1
10 TABLET ORAL NIGHTLY
Qty: 30 TABLET | Refills: 4 | Status: SHIPPED | OUTPATIENT
Start: 2024-11-27

## 2025-01-07 ENCOUNTER — APPOINTMENT (OUTPATIENT)
Dept: PRIMARY CARE | Facility: CLINIC | Age: 30
End: 2025-01-07
Payer: COMMERCIAL

## 2025-01-08 ASSESSMENT — PROMIS GLOBAL HEALTH SCALE
RATE_AVERAGE_FATIGUE: SEVERE
RATE_GENERAL_HEALTH: FAIR
RATE_AVERAGE_PAIN: 5
EMOTIONAL_PROBLEMS: RARELY
RATE_QUALITY_OF_LIFE: GOOD
CARRYOUT_SOCIAL_ACTIVITIES: VERY GOOD
RATE_PHYSICAL_HEALTH: GOOD
RATE_MENTAL_HEALTH: VERY GOOD
CARRYOUT_PHYSICAL_ACTIVITIES: MOSTLY
RATE_SOCIAL_SATISFACTION: VERY GOOD

## 2025-01-10 ENCOUNTER — APPOINTMENT (OUTPATIENT)
Dept: PRIMARY CARE | Facility: CLINIC | Age: 30
End: 2025-01-10
Payer: COMMERCIAL

## 2025-01-10 VITALS
OXYGEN SATURATION: 99 % | DIASTOLIC BLOOD PRESSURE: 78 MMHG | WEIGHT: 210 LBS | HEIGHT: 67 IN | SYSTOLIC BLOOD PRESSURE: 120 MMHG | BODY MASS INDEX: 32.96 KG/M2 | HEART RATE: 108 BPM

## 2025-01-10 DIAGNOSIS — E55.9 VITAMIN D DEFICIENCY: ICD-10-CM

## 2025-01-10 DIAGNOSIS — I95.1 ORTHOSTATIC HYPOTENSION: Chronic | ICD-10-CM

## 2025-01-10 DIAGNOSIS — E61.1 IRON DEFICIENCY: Chronic | ICD-10-CM

## 2025-01-10 DIAGNOSIS — Z13.29 THYROID DISORDER SCREENING: ICD-10-CM

## 2025-01-10 DIAGNOSIS — R53.83 OTHER FATIGUE: ICD-10-CM

## 2025-01-10 DIAGNOSIS — N92.0 MENORRHAGIA WITH REGULAR CYCLE: Chronic | ICD-10-CM

## 2025-01-10 DIAGNOSIS — F51.01 PRIMARY INSOMNIA: Chronic | ICD-10-CM

## 2025-01-10 DIAGNOSIS — Z91.89 COMPLIANCE WITH MEDICATION REGIMEN: ICD-10-CM

## 2025-01-10 DIAGNOSIS — F90.0 ATTENTION DEFICIT HYPERACTIVITY DISORDER (ADHD), PREDOMINANTLY INATTENTIVE TYPE: ICD-10-CM

## 2025-01-10 DIAGNOSIS — F41.9 ANXIETY AND DEPRESSION: ICD-10-CM

## 2025-01-10 DIAGNOSIS — Z00.00 ANNUAL PHYSICAL EXAM: Primary | ICD-10-CM

## 2025-01-10 DIAGNOSIS — F32.A ANXIETY AND DEPRESSION: ICD-10-CM

## 2025-01-10 PROCEDURE — 1036F TOBACCO NON-USER: CPT | Performed by: STUDENT IN AN ORGANIZED HEALTH CARE EDUCATION/TRAINING PROGRAM

## 2025-01-10 PROCEDURE — 99395 PREV VISIT EST AGE 18-39: CPT | Performed by: STUDENT IN AN ORGANIZED HEALTH CARE EDUCATION/TRAINING PROGRAM

## 2025-01-10 PROCEDURE — 3008F BODY MASS INDEX DOCD: CPT | Performed by: STUDENT IN AN ORGANIZED HEALTH CARE EDUCATION/TRAINING PROGRAM

## 2025-01-10 RX ORDER — LISDEXAMFETAMINE DIMESYLATE 50 MG/1
50 CAPSULE ORAL EVERY MORNING
Qty: 30 CAPSULE | Refills: 0 | Status: SHIPPED | OUTPATIENT
Start: 2025-01-10 | End: 2025-02-09

## 2025-01-10 ASSESSMENT — ENCOUNTER SYMPTOMS
BACK PAIN: 1
CARDIOVASCULAR NEGATIVE: 1
FATIGUE: 1
GASTROINTESTINAL NEGATIVE: 1
SLEEP DISTURBANCE: 1
DECREASED CONCENTRATION: 1
ARTHRALGIAS: 1
RESPIRATORY NEGATIVE: 1

## 2025-01-10 ASSESSMENT — PATIENT HEALTH QUESTIONNAIRE - PHQ9
1. LITTLE INTEREST OR PLEASURE IN DOING THINGS: NOT AT ALL
SUM OF ALL RESPONSES TO PHQ9 QUESTIONS 1 AND 2: 0
2. FEELING DOWN, DEPRESSED OR HOPELESS: NOT AT ALL

## 2025-01-10 NOTE — PROGRESS NOTES
"Subjective   Patient ID: Elsy Claire \"Ania\" is a 29 y.o. adult who presents for Annual Exam (Physical/Med refills).  ADHD follow up. Liking vyvanse at current 50mg dose. Seems more effective than adderall.     Taking trazodone every other day. Feels this dose is working well. Sleep chronically low. Getting 6 hours sleep per night.     Brother had shingles at age 25.      Work is going well.     Has IUD placed 2020, does not get much bleeding but having severe cramps. This started in the last year. Uses TENS unit and their medical marijuana. Interested in sterilization.     Back still achy, seeing chiropractor next week.     Mother with osteoporosis in her 50's.     Noticing easily overheats. Was happening prior to stimulants. Some spots in their vision. Their partner mentions that it takes them a long time to recover from physical activity. Seems disproportionate to activity level.     No other concerns today.         Review of Systems   Constitutional:  Positive for fatigue.   HENT: Negative.     Respiratory: Negative.     Cardiovascular: Negative.    Gastrointestinal: Negative.    Genitourinary:  Positive for menstrual problem.   Musculoskeletal:  Positive for arthralgias and back pain.   Psychiatric/Behavioral:  Positive for decreased concentration and sleep disturbance.    All other systems reviewed and are negative.      Objective   Physical Exam  Vitals reviewed.   Constitutional:       General: Ania is not in acute distress.     Appearance: Normal appearance.   HENT:      Head: Normocephalic.      Nose: Nose normal.      Mouth/Throat:      Mouth: Mucous membranes are moist.   Eyes:      Pupils: Pupils are equal, round, and reactive to light.   Cardiovascular:      Rate and Rhythm: Normal rate and regular rhythm.   Pulmonary:      Effort: Pulmonary effort is normal. No respiratory distress.   Abdominal:      Palpations: Abdomen is soft.   Musculoskeletal:         General: Normal range of motion. "   Skin:     General: Skin is warm and dry.   Neurological:      Mental Status: [facility] is alert. Mental status is at baseline.   Psychiatric:         Mood and Affect: Mood normal.         Behavior: Behavior normal.         Body mass index is 32.89 kg/m².      Current Outpatient Medications:     albuterol 90 mcg/actuation inhaler, Inhale 2 puffs 3 times a day., Disp: 18 g, Rfl: 1    hydrOXYzine pamoate (Vistaril) 25 mg capsule, Take 1 capsule by mouth three times daily as needed for anxiety., Disp: 90 capsule, Rfl: 0    montelukast (Singulair) 10 mg tablet, Take 1 tablet by mouth once daily at bedtime., Disp: 30 tablet, Rfl: 4    propranolol (Inderal) 20 mg tablet, Take 1 tablet (20 mg) by mouth 2 times a day as needed (anxiety, palpitations)., Disp: 60 tablet, Rfl: 1    sertraline (Zoloft) 100 mg tablet, Take 2 tablets (200 mg) by mouth once daily., Disp: 90 tablet, Rfl: 3    traZODone (Desyrel) 50 mg tablet, Take 1 tablet by mouth once daily at bedtime., Disp: 90 tablet, Rfl: 0    lisdexamfetamine (Vyvanse) 50 mg capsule, Take 1 capsule (50 mg) by mouth once daily in the morning., Disp: 30 capsule, Rfl: 0    Assessment/Plan   Diagnoses and all orders for this visit:  Annual physical exam  Comments:  screening labs  Orders:  -     CBC and Auto Differential; Future  -     Comprehensive metabolic panel; Future  -     Hemoglobin A1c; Future  Attention deficit hyperactivity disorder (ADHD), predominantly inattentive type  Comments:  doing well on 50mg vyvanse  CSA today  no side effects  using appropriately  Orders:  -     lisdexamfetamine (Vyvanse) 50 mg capsule; Take 1 capsule (50 mg) by mouth once daily in the morning.  Menorrhagia with regular cycle  Comments:  significant pain and cramping with periods  interested in sterilization  Orders:  -     Referral to Obstetrics / Gynecology; Future  Compliance with medication regimen  Primary insomnia  Comments:  doing well on trazodone  Anxiety and depression  Thyroid  disorder screening  -     Tsh With Reflex To Free T4 If Abnormal; Future  Vitamin D deficiency  -     Vitamin D 25-Hydroxy,Total (for eval of Vitamin D levels); Future  Iron deficiency  Comments:  check levels  Orders:  -     Iron and TIBC; Future  Orthostatic hypotension  Comments:  follow up with genetecist  consider tilt table test  Other fatigue    [facility] report reviewed for Elsy Claire today and is consistent with prescribed therapy.  We weighed the risks and benefit of this therapy and will continue with the current plan       Follow up in 3 months    Zarina Grigsby DO 01/10/25 8:50 PM

## 2025-01-14 ENCOUNTER — APPOINTMENT (OUTPATIENT)
Dept: PRIMARY CARE | Facility: CLINIC | Age: 30
End: 2025-01-14
Payer: COMMERCIAL

## 2025-01-15 ENCOUNTER — TELEPHONE (OUTPATIENT)
Dept: GENETICS | Facility: CLINIC | Age: 30
End: 2025-01-15
Payer: COMMERCIAL

## 2025-01-15 ENCOUNTER — PATIENT MESSAGE (OUTPATIENT)
Dept: PRIMARY CARE | Facility: CLINIC | Age: 30
End: 2025-01-15
Payer: COMMERCIAL

## 2025-01-15 DIAGNOSIS — Z13.29 THYROID DISORDER SCREENING: Primary | ICD-10-CM

## 2025-01-15 DIAGNOSIS — M24.80 GENERALIZED HYPERMOBILITY OF JOINTS: ICD-10-CM

## 2025-01-15 DIAGNOSIS — M25.50 POLYARTHRALGIA: Primary | ICD-10-CM

## 2025-01-15 DIAGNOSIS — M79.10 MYALGIA: ICD-10-CM

## 2025-01-15 NOTE — PROGRESS NOTES
"Subjective   Patient ID: Elsy Claire \"Ania\" is a 29 y.o. adult who presents for No chief complaint on file..  HPI    Review of Systems    Objective   Physical Exam    Assessment/Plan            Zarina Grigsby DO 01/15/25 2:59 PM   "

## 2025-01-21 ENCOUNTER — LAB (OUTPATIENT)
Dept: LAB | Facility: LAB | Age: 30
End: 2025-01-21
Payer: COMMERCIAL

## 2025-01-21 DIAGNOSIS — E55.9 VITAMIN D DEFICIENCY: ICD-10-CM

## 2025-01-21 DIAGNOSIS — Z00.00 ANNUAL PHYSICAL EXAM: ICD-10-CM

## 2025-01-21 DIAGNOSIS — F41.9 ANXIETY AND DEPRESSION: ICD-10-CM

## 2025-01-21 DIAGNOSIS — Z13.29 THYROID DISORDER SCREENING: ICD-10-CM

## 2025-01-21 DIAGNOSIS — F32.A ANXIETY AND DEPRESSION: ICD-10-CM

## 2025-01-21 DIAGNOSIS — E61.1 IRON DEFICIENCY: Chronic | ICD-10-CM

## 2025-01-21 LAB
25(OH)D3 SERPL-MCNC: 24 NG/ML (ref 30–100)
ALBUMIN SERPL BCP-MCNC: 4.6 G/DL (ref 3.4–5)
ALP SERPL-CCNC: 42 U/L (ref 33–120)
ALT SERPL W P-5'-P-CCNC: 28 U/L (ref 7–52)
ANION GAP SERPL CALC-SCNC: 13 MMOL/L (ref 10–20)
AST SERPL W P-5'-P-CCNC: 18 U/L (ref 9–39)
BASOPHILS # BLD AUTO: 0.06 X10*3/UL (ref 0–0.1)
BASOPHILS NFR BLD AUTO: 0.8 %
BILIRUB SERPL-MCNC: 0.4 MG/DL (ref 0–1.2)
BUN SERPL-MCNC: 11 MG/DL (ref 6–23)
CALCIUM SERPL-MCNC: 9.3 MG/DL (ref 8.6–10.3)
CHLORIDE SERPL-SCNC: 103 MMOL/L (ref 98–107)
CO2 SERPL-SCNC: 25 MMOL/L (ref 21–32)
CREAT SERPL-MCNC: 0.83 MG/DL (ref 0.5–1.3)
EGFRCR SERPLBLD CKD-EPI 2021: >90 ML/MIN/1.73M*2
EOSINOPHIL # BLD AUTO: 0.13 X10*3/UL (ref 0–0.7)
EOSINOPHIL NFR BLD AUTO: 1.6 %
ERYTHROCYTE [DISTWIDTH] IN BLOOD BY AUTOMATED COUNT: 12.4 % (ref 11.5–14.5)
EST. AVERAGE GLUCOSE BLD GHB EST-MCNC: 100 MG/DL
GLUCOSE SERPL-MCNC: 103 MG/DL (ref 74–99)
HBA1C MFR BLD: 5.1 %
HCT VFR BLD AUTO: 41.4 % (ref 36–52)
HGB BLD-MCNC: 13.6 G/DL (ref 12–17.5)
IMM GRANULOCYTES # BLD AUTO: 0.01 X10*3/UL (ref 0–0.7)
IMM GRANULOCYTES NFR BLD AUTO: 0.1 % (ref 0–0.9)
IRON SATN MFR SERPL: 22 % (ref 25–45)
IRON SERPL-MCNC: 84 UG/DL (ref 35–150)
LYMPHOCYTES # BLD AUTO: 3.18 X10*3/UL (ref 1.2–4.8)
LYMPHOCYTES NFR BLD AUTO: 40.3 %
MCH RBC QN AUTO: 28.9 PG (ref 26–34)
MCHC RBC AUTO-ENTMCNC: 32.9 G/DL (ref 32–36)
MCV RBC AUTO: 88 FL (ref 80–100)
MONOCYTES # BLD AUTO: 0.52 X10*3/UL (ref 0.1–1)
MONOCYTES NFR BLD AUTO: 6.6 %
NEUTROPHILS # BLD AUTO: 3.99 X10*3/UL (ref 1.2–7.7)
NEUTROPHILS NFR BLD AUTO: 50.6 %
NRBC BLD-RTO: 0 /100 WBCS (ref 0–0)
PLATELET # BLD AUTO: 304 X10*3/UL (ref 150–450)
POTASSIUM SERPL-SCNC: 4 MMOL/L (ref 3.5–5.3)
PROT SERPL-MCNC: 7.5 G/DL (ref 6.4–8.2)
RBC # BLD AUTO: 4.7 X10*6/UL (ref 4–5.9)
SODIUM SERPL-SCNC: 137 MMOL/L (ref 136–145)
TIBC SERPL-MCNC: 388 UG/DL (ref 240–445)
TSH SERPL-ACNC: 3.95 MIU/L (ref 0.44–3.98)
UIBC SERPL-MCNC: 304 UG/DL (ref 110–370)
WBC # BLD AUTO: 7.9 X10*3/UL (ref 4.4–11.3)

## 2025-01-21 PROCEDURE — 80053 COMPREHEN METABOLIC PANEL: CPT

## 2025-01-21 PROCEDURE — 83036 HEMOGLOBIN GLYCOSYLATED A1C: CPT

## 2025-01-21 PROCEDURE — 84443 ASSAY THYROID STIM HORMONE: CPT

## 2025-01-21 PROCEDURE — 83540 ASSAY OF IRON: CPT

## 2025-01-21 PROCEDURE — 85025 COMPLETE CBC W/AUTO DIFF WBC: CPT

## 2025-01-21 PROCEDURE — 82306 VITAMIN D 25 HYDROXY: CPT

## 2025-01-21 PROCEDURE — 83550 IRON BINDING TEST: CPT

## 2025-01-21 RX ORDER — HYDROXYZINE PAMOATE 25 MG/1
CAPSULE ORAL
Qty: 90 CAPSULE | Refills: 0 | Status: SHIPPED | OUTPATIENT
Start: 2025-01-21

## 2025-01-22 DIAGNOSIS — Z13.29 THYROID DISORDER SCREENING: Primary | ICD-10-CM

## 2025-01-22 NOTE — PROGRESS NOTES
"Subjective   Patient ID: Elsy Claire \"Ania\" is a 29 y.o. adult who presents for No chief complaint on file..  HPI    Review of Systems    Objective   Physical Exam    Assessment/Plan            Zarina Grigsby DO 01/22/25 12:58 PM   " Statement Selected

## 2025-01-22 NOTE — PROGRESS NOTES
"Subjective   Patient ID: Elsy Claire \"Ania\" is a 29 y.o. adult who presents for No chief complaint on file..  HPI    Review of Systems    Objective   Physical Exam    Assessment/Plan            Zarina Grigsby DO 01/22/25 4:58 PM   "

## 2025-02-01 DIAGNOSIS — F51.01 PRIMARY INSOMNIA: ICD-10-CM

## 2025-02-03 RX ORDER — TRAZODONE HYDROCHLORIDE 50 MG/1
50 TABLET ORAL NIGHTLY
Qty: 90 TABLET | Refills: 0 | Status: SHIPPED | OUTPATIENT
Start: 2025-02-03

## 2025-02-05 ENCOUNTER — PATIENT MESSAGE (OUTPATIENT)
Dept: PRIMARY CARE | Facility: CLINIC | Age: 30
End: 2025-02-05
Payer: COMMERCIAL

## 2025-02-05 DIAGNOSIS — F90.0 ATTENTION DEFICIT HYPERACTIVITY DISORDER (ADHD), PREDOMINANTLY INATTENTIVE TYPE: ICD-10-CM

## 2025-02-05 RX ORDER — LISDEXAMFETAMINE DIMESYLATE 50 MG/1
50 CAPSULE ORAL EVERY MORNING
Qty: 30 CAPSULE | Refills: 0 | Status: SHIPPED | OUTPATIENT
Start: 2025-02-05 | End: 2025-03-07

## 2025-02-18 ENCOUNTER — HOSPITAL ENCOUNTER (OUTPATIENT)
Dept: CARDIOLOGY | Facility: CLINIC | Age: 30
Discharge: HOME | End: 2025-02-18
Payer: COMMERCIAL

## 2025-02-18 ENCOUNTER — APPOINTMENT (OUTPATIENT)
Dept: OBSTETRICS AND GYNECOLOGY | Facility: CLINIC | Age: 30
End: 2025-02-18
Payer: COMMERCIAL

## 2025-02-18 VITALS
BODY MASS INDEX: 31.86 KG/M2 | DIASTOLIC BLOOD PRESSURE: 72 MMHG | SYSTOLIC BLOOD PRESSURE: 136 MMHG | HEIGHT: 67 IN | WEIGHT: 203 LBS

## 2025-02-18 VITALS — HEIGHT: 67 IN | BODY MASS INDEX: 32.89 KG/M2

## 2025-02-18 DIAGNOSIS — Z11.3 ROUTINE SCREENING FOR STI (SEXUALLY TRANSMITTED INFECTION): Primary | ICD-10-CM

## 2025-02-18 DIAGNOSIS — N94.6 DYSMENORRHEA: ICD-10-CM

## 2025-02-18 DIAGNOSIS — Z12.4 SCREENING FOR MALIGNANT NEOPLASM OF CERVIX: ICD-10-CM

## 2025-02-18 DIAGNOSIS — R53.83 OTHER FATIGUE: ICD-10-CM

## 2025-02-18 DIAGNOSIS — M24.80 GENERALIZED HYPERMOBILITY OF JOINTS: ICD-10-CM

## 2025-02-18 PROBLEM — N92.0 MENORRHAGIA WITH REGULAR CYCLE: Status: RESOLVED | Noted: 2021-06-01 | Resolved: 2025-02-18

## 2025-02-18 LAB
AORTIC VALVE MEAN GRADIENT: 4 MMHG
AORTIC VALVE PEAK VELOCITY: 1.47 M/S
AV PEAK GRADIENT: 9 MMHG
AVA (PEAK VEL): 1.8 CM2
AVA (VTI): 2.05 CM2
EJECTION FRACTION APICAL 4 CHAMBER: 62.6
EJECTION FRACTION: 63 %
LEFT VENTRICLE INTERNAL DIMENSION DIASTOLE: 4.53 CM (ref 3.5–6)
LEFT VENTRICULAR OUTFLOW TRACT DIAMETER: 2.02 CM
MITRAL VALVE E/A RATIO: 1.03
RIGHT VENTRICLE FREE WALL PEAK S': 14 CM/S
RIGHT VENTRICLE PEAK SYSTOLIC PRESSURE: 19.3 MMHG
TRICUSPID ANNULAR PLANE SYSTOLIC EXCURSION: 2.4 CM

## 2025-02-18 PROCEDURE — 87591 N.GONORRHOEAE DNA AMP PROB: CPT

## 2025-02-18 PROCEDURE — 93306 TTE W/DOPPLER COMPLETE: CPT | Performed by: INTERNAL MEDICINE

## 2025-02-18 PROCEDURE — 87661 TRICHOMONAS VAGINALIS AMPLIF: CPT

## 2025-02-18 PROCEDURE — 87491 CHLMYD TRACH DNA AMP PROBE: CPT

## 2025-02-18 PROCEDURE — 93306 TTE W/DOPPLER COMPLETE: CPT

## 2025-02-18 PROCEDURE — 1036F TOBACCO NON-USER: CPT | Performed by: OBSTETRICS & GYNECOLOGY

## 2025-02-18 PROCEDURE — 99385 PREV VISIT NEW AGE 18-39: CPT | Performed by: OBSTETRICS & GYNECOLOGY

## 2025-02-18 PROCEDURE — 3008F BODY MASS INDEX DOCD: CPT | Performed by: OBSTETRICS & GYNECOLOGY

## 2025-02-18 RX ORDER — NORETHINDRONE ACETATE AND ETHINYL ESTRADIOL .02; 1 MG/1; MG/1
1 TABLET ORAL DAILY
Qty: 84 TABLET | Refills: 3 | Status: SHIPPED | OUTPATIENT
Start: 2025-02-18 | End: 2026-02-18

## 2025-02-18 ASSESSMENT — ENCOUNTER SYMPTOMS
CHILLS: 0
CONSTIPATION: 0
SHORTNESS OF BREATH: 0
HEMATURIA: 0
NAUSEA: 0
HEADACHES: 0
FREQUENCY: 0
WEAKNESS: 0
ABDOMINAL PAIN: 0
VOMITING: 0
PALPITATIONS: 0
DIARRHEA: 0
COUGH: 0
FEVER: 0
DYSURIA: 0
DIZZINESS: 0

## 2025-02-18 ASSESSMENT — PAIN SCALES - GENERAL: PAINLEVEL_OUTOF10: 0-NO PAIN

## 2025-02-18 NOTE — PROGRESS NOTES
Well Woman Visit    SUBJECTIVE  29 y.o.  nonbinary presents for well exam     OB/GYN History  OB History    Para Term  AB Living   0 0 0 0 0 0   SAB IAB Ectopic Multiple Live Births   0 0 0 0 0       Menstrual status: IUD  No LMP recorded. (Menstrual status: IUD).  Menses: Normally very painful and heavy. Has improved with IUD. Reports cramping has started to come back over the last year.  Abnormal bleeding: Rare spotting.  Discharge: No  Pelvic pain: Yes - has significant dysmenorrhea with periods  Pelvic prolapse: No  Urinary/fecal incontinence or overactive bladder: No  Breast concerns: No  Mood concerns: No    Social History     Substance and Sexual Activity   Sexual Activity Yes    Partners: Female, Male    Birth control/protection: Condom Male, Emergency Contraception, I.U.D.    Comment: I have a female nesting partner. We are poly and see others     Sexually transmitted infections:no past history  History of abnormal pap: No  Last pap: approximate date  and was normal  Sexual concerns: No  Desire to become pregnant in the next year: No interest in pregnancy. Uterus is somewhat dysmorphic for her.     Other: None     The following portions of the chart were reviewed this encounter and updated as appropriate:    Tobacco  Allergies  Meds  Problems  Med Hx  Surg Hx  Fam Hx          Screenings  Social Drivers of Health     Tobacco Use: Low Risk  (2025)    Patient History     Smoking Tobacco Use: Never     Smokeless Tobacco Use: Never     Passive Exposure: Not on file   Alcohol Use: Not on file   Financial Resource Strain: Not on file   Food Insecurity: Not on file   Transportation Needs: Not on file   Physical Activity: Not on file   Stress: Not on file   Social Connections: Not on file   Intimate Partner Violence: Not on file   Depression: Not at risk (1/10/2025)    PHQ-2     PHQ-2 Score: 0   Housing Stability: Not on file   Utilities: Not on file   Digital Equity: Not on file  "  Health Literacy: Not on file         Hereditary Cancer Risk Assessment:   Family History   Problem Relation Name Age of Onset    Arthritis Mother Arelis Chaogomery     Cancer Mother Arelis Chaogomery     Depression Mother Arelis Ordonezmery     Hypertension Mother Arelis Ordonezmery     Mental illness Mother Arelis Ordonezmery     Hypertension Father Ryan Claire     Mental illness Father Ryan Claire     Arthritis Maternal Grandmother Arelis Ordonezmery     Depression Maternal Grandmother Arelis Ordonezmery     Mental illness Maternal Grandmother Arelis Antunez     Cancer Paternal Grandmother Adele Claire     Depression Brother Patrick Claire     Mental illness Brother Patrick Claire         Review of Systems  Review of Systems   Constitutional:  Negative for chills and fever.   Eyes:  Negative for visual disturbance.   Respiratory:  Negative for cough and shortness of breath.    Cardiovascular:  Negative for chest pain and palpitations.   Gastrointestinal:  Negative for abdominal pain, constipation, diarrhea, nausea and vomiting.   Genitourinary:  Positive for menstrual problem and pelvic pain. Negative for dyspareunia, dysuria, frequency, hematuria, urgency, vaginal bleeding and vaginal discharge.   Neurological:  Negative for dizziness, weakness and headaches.            OBJECTIVE  Vitals:    02/18/25 1403   BP: 136/72   Weight: 92.1 kg (203 lb)   Height: 1.702 m (5' 7\")     Body mass index is 31.79 kg/m².      Physical Exam  Constitutional:       General: Ania is not in acute distress.     Appearance: Normal appearance.   Genitourinary:      Vulva and rectum normal.      Right Labia: No skin changes.     Left Labia: No skin changes.     No vaginal discharge.        Right Adnexa: not tender and no mass present.     Left Adnexa: not tender and no mass present.     No cervical motion tenderness, discharge or lesion.      Uterus is not enlarged or tender.   HENT:      Head: Normocephalic and atraumatic.      Nose: Nose " normal.      Mouth/Throat:      Mouth: Mucous membranes are moist.      Pharynx: Oropharynx is clear.   Eyes:      Extraocular Movements: Extraocular movements intact.      Conjunctiva/sclera: Conjunctivae normal.      Pupils: Pupils are equal, round, and reactive to light.   Cardiovascular:      Rate and Rhythm: Normal rate.      Pulses: Normal pulses.   Pulmonary:      Effort: Pulmonary effort is normal.   Abdominal:      General: Abdomen is flat. There is no distension.      Palpations: Abdomen is soft.      Tenderness: There is no abdominal tenderness. There is no guarding or rebound.   Musculoskeletal:         General: Normal range of motion.   Neurological:      General: No focal deficit present.      Mental Status: Ania is alert and oriented to person, place, and time.   Skin:     General: Skin is warm and dry.   Psychiatric:         Mood and Affect: Mood normal.         Behavior: Behavior normal.   Vitals reviewed.          Immunization History   Administered Date(s) Administered    COVID-19, mRNA, LNP-S, PF, 30 mcg/0.3 mL dose 03/31/2021, 04/21/2021    DTP 1995, 1995, 03/12/1996, 01/14/1997, 08/01/2000, 11/05/2008    Flu vaccine (IIV4), preservative free *Check age/dose* 12/10/2021    HPV, Quadrivalent 03/22/2011, 05/28/2011, 09/24/2011    Hepatitis B vaccine, 19 yrs and under (RECOMBIVAX, ENGERIX) 1995, 1995, 02/12/1996    HiB, unspecified 08/28/1996    Influenza, live, intranasal 09/24/2011    MMR vaccine, subcutaneous (MMR II) 08/28/1996, 08/01/2000    Meningococcal ACWY-D (Menactra) 4-valent conjugate vaccine 11/05/2008    Moderna SARS-CoV-2 Vaccination 12/10/2021, 05/29/2022    Novel Influenza-H1N1-09, nasal 11/10/2009    Polio, Unspecified 1995, 1995, 02/12/1996, 09/23/1999    Tdap vaccine, age 7 year and older (BOOSTRIX, ADACEL) 05/23/2023         ASSESSMENT AND PLAN  -Well woman screenings performed today  -Reviewed cervical cancer screening recommendations.  Gardasil received  -Discussed reproductive life plan, folic acid supplementation, contraception, emergency contraception   -Encouraged routine wellness exams with PCP Zarina Grigsby DO     -Issues identified and addressed today:   Problem List Items Addressed This Visit          Ob-Gyn Problems    Dysmenorrhea    Overview     - Patient with longstanding history of painful periods, concern for possible endometriosis  - IUD significantly improved symptoms, still has no bleeding ut is starting to have more cramping  - Never had imaging of her uterus - TVUS ordered  - Has only tried IUD, no other medications. Otherwise controls periods with ibuprofen/tylenol and TENS unit  - Not interested in IUD removal and replacement  - Estrogen candidate - given has never tried first line treatment with estrogen for endometriosis plan for continuous OCPs         Relevant Medications    norethindrone ac-eth estradioL (Microgestin 1/20, 21,) 1-20 mg-mcg tablet    Other Relevant Orders    US PELVIS TRANSABDOMINAL WITH TRANSVAGINAL     Other Visit Diagnoses       Routine screening for STI (sexually transmitted infection)    -  Primary    Relevant Orders    Hepatitis C Antibody    Hepatitis B Surface Antigen    Syphilis Screen with Reflex    HIV 1/2 Antigen/Antibody Screen with Reflex to Confirmation    Screening for malignant neoplasm of cervix        Relevant Orders    THINPREP PAP          Follow up 1 year, sooner if needed    Radhika Stockton MD  Obstetrics & Gynecology  02/18/25

## 2025-02-20 LAB
C TRACH RRNA SPEC QL NAA+PROBE: NEGATIVE
N GONORRHOEA DNA SPEC QL PROBE+SIG AMP: NEGATIVE
T VAGINALIS RRNA SPEC QL NAA+PROBE: NEGATIVE
T4 FREE SERPL-MCNC: 1.1 NG/DL (ref 0.8–1.8)
THYROPEROXIDASE AB SERPL-ACNC: 1 IU/ML

## 2025-03-11 ENCOUNTER — TELEPHONE (OUTPATIENT)
Dept: OBSTETRICS AND GYNECOLOGY | Facility: CLINIC | Age: 30
End: 2025-03-11
Payer: COMMERCIAL

## 2025-03-11 ENCOUNTER — PATIENT MESSAGE (OUTPATIENT)
Dept: PRIMARY CARE | Facility: CLINIC | Age: 30
End: 2025-03-11
Payer: COMMERCIAL

## 2025-03-11 DIAGNOSIS — F90.0 ATTENTION DEFICIT HYPERACTIVITY DISORDER (ADHD), PREDOMINANTLY INATTENTIVE TYPE: ICD-10-CM

## 2025-03-11 LAB
CYTOLOGY CMNT CVX/VAG CYTO-IMP: NORMAL
HPV HR 12 DNA GENITAL QL NAA+PROBE: POSITIVE
HPV HR GENOTYPES PNL CVX NAA+PROBE: POSITIVE
HPV16 DNA SPEC QL NAA+PROBE: NEGATIVE
HPV18 DNA SPEC QL NAA+PROBE: NEGATIVE
LAB AP HPV GENOTYPE QUESTION: YES
LAB AP HPV HR: NORMAL
LAB AP PAP ADDITIONAL TESTS: NORMAL
LABORATORY COMMENT REPORT: NORMAL
PATH REPORT.TOTAL CANCER: NORMAL

## 2025-03-11 NOTE — TELEPHONE ENCOUNTER
----- Message from Radhika Stockton sent at 3/11/2025  2:52 PM EDT -----  Hi - Can we please get Ania scheduled for a colposcopy. I am happy to do this procedure for them. I know they had requested an alternative provider after our last visit so please schedule with anyone they are comfortable with. Thanks!

## 2025-03-11 NOTE — TELEPHONE ENCOUNTER
Called patient. Patient verified by name and . Patient informed and educated on pap results. Patient scheduled for a colpo. Patient has no questions or concerns at this time.

## 2025-03-12 RX ORDER — LISDEXAMFETAMINE DIMESYLATE 50 MG/1
50 CAPSULE ORAL EVERY MORNING
Qty: 30 CAPSULE | Refills: 0 | Status: SHIPPED | OUTPATIENT
Start: 2025-03-12 | End: 2025-04-11

## 2025-03-14 ENCOUNTER — PATIENT MESSAGE (OUTPATIENT)
Dept: GENETICS | Facility: CLINIC | Age: 30
End: 2025-03-14

## 2025-03-14 ENCOUNTER — TELEPHONE (OUTPATIENT)
Dept: OBSTETRICS AND GYNECOLOGY | Facility: HOSPITAL | Age: 30
End: 2025-03-14

## 2025-03-14 ENCOUNTER — APPOINTMENT (OUTPATIENT)
Dept: GENETICS | Facility: CLINIC | Age: 30
End: 2025-03-14
Payer: COMMERCIAL

## 2025-03-14 VITALS
BODY MASS INDEX: 31.89 KG/M2 | WEIGHT: 203.2 LBS | HEART RATE: 107 BPM | HEIGHT: 67 IN | SYSTOLIC BLOOD PRESSURE: 137 MMHG | DIASTOLIC BLOOD PRESSURE: 92 MMHG

## 2025-03-14 DIAGNOSIS — Z81.8: ICD-10-CM

## 2025-03-14 DIAGNOSIS — Z80.0 FAMILY HX OF COLORECTAL CANCER: ICD-10-CM

## 2025-03-14 DIAGNOSIS — Z82.5 FAMILY HX-ASTHMA: ICD-10-CM

## 2025-03-14 DIAGNOSIS — Z82.49: ICD-10-CM

## 2025-03-14 DIAGNOSIS — M25.50 POLYARTHRALGIA: ICD-10-CM

## 2025-03-14 DIAGNOSIS — Z83.0 FAMILY HISTORY OF HIV: ICD-10-CM

## 2025-03-14 DIAGNOSIS — Z81.8 FAMILY HISTORY OF DEPRESSION: ICD-10-CM

## 2025-03-14 DIAGNOSIS — Z82.62 FAM HX-OSTEOPOROSIS: ICD-10-CM

## 2025-03-14 DIAGNOSIS — R42 DIZZINESS ON STANDING: ICD-10-CM

## 2025-03-14 DIAGNOSIS — M24.80 GENERALIZED HYPERMOBILITY OF JOINTS: Primary | ICD-10-CM

## 2025-03-14 DIAGNOSIS — M79.10 MYALGIA: ICD-10-CM

## 2025-03-14 DIAGNOSIS — F43.10 PTSD (POST-TRAUMATIC STRESS DISORDER): Primary | ICD-10-CM

## 2025-03-14 DIAGNOSIS — Z81.8 FAMILY HISTORY OF ANXIETY DISORDER: ICD-10-CM

## 2025-03-14 DIAGNOSIS — M35.7 HYPERMOBILITY SYNDROME: ICD-10-CM

## 2025-03-14 DIAGNOSIS — Z84.1 FAMILY HISTORY OF RENAL FAILURE: ICD-10-CM

## 2025-03-14 RX ORDER — LORAZEPAM 1 MG/1
1 TABLET ORAL ONCE AS NEEDED
Qty: 1 TABLET | Refills: 0 | Status: SHIPPED | OUTPATIENT
Start: 2025-03-14

## 2025-03-14 NOTE — LETTER
03/14/25    Zarina Grigsby DO  [facility] [facility] [address]      Dear Dr. Zarina Grigsby DO,    Thank you for referring your patient, Ania Claire, to receive care in my office. I have enclosed a summary of the care provided to Ania on 03/14/25.    Please contact me with any questions you may have regarding the visit.    Sincerely,         Vernell Manley MD  [address] [facility] [place]  [facility] [address]  [facility] [address] [phone]    CC: No Recipients

## 2025-03-14 NOTE — PROGRESS NOTES
"  Genetics Department  [address] [place] [address]  P: [phone]  F: [phone]      Subjective:   Reason for Visit:   Elsy \"Ania\" is a 29 y.o. 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 Ania is being seen in consultation at the request of Dr. Zarina Grigsby. Ania is accompanied in the office by Ania's  wife . The information for this visit was obtained from the patient and the medical records. In terms of pronouns, Ania uses she and they      History of Present Illness:   She states that she gets winded quickly and is sore and achy that can be joint or muscle.  She can pop a breastbone. She has had the joint pain for at least the past 11 years.  Her back and neck joints typically hurt in addition to her hips, wrists, and fingers.  In terms of the joint pain activity makes it worse.  Her joint pain is improved with medical marijuana and baths. It is worse when she doesn't drink water.  She recently tried a chiropractor that seemed to help.     She states that her muscles seem to ache most of the time.  Her legs are sore. Her neck and shoulders and top of back are usually sore.      She states that after sitting with her legs crossed, her legs fall asleep easily.    Saw rheumatology in the past for arthralgia.  She doesn't have joint swelling.  Her joints are achy sporadically.  It doesn't appear to be worse at the beginning or at the end. No stiffness or redness of joints. From the rheumatologist's note \"Overall no current clinical or serological concern for underlying inflammatory arthritis/myositis/SLE as etiology to her symptoms. Pt has hypermobility based on Beighton score. We discussed pain related to hypermobility which maybe due to tendinopathy, accelerated joint degeneration, myofascial restrictions, nerve entrapment and neuropathic/sensitized nerves among other causes. We can consider physical therapy referral to " "discuss exercises for joint stability and avoid injury- pt states she is aware of activity modification and will defer for now.\" EDS was suspected.    When standing or sitting up, she can see stars.  She states that she has a heat intolerance and will sweat a lot and gets nauseous.     She states that she will have NB diarrhea occasionally. No accompanied stomach pain.  Occasionally will have cramps or stress.    No history of joint dislocation. No history of unusual fractures, scoliosis that required any bracing or intervention, or pectus differences.   No history of skin fragility, skin hyperextensibility, stretch marks not related to weight change, abnormal scar formation or atrophic scars.  She states that she has easy bruising.  She states that she scars easily.    No history of dental crowding or gingival disease.  She had a palate expander as a kid and braces.  No history of pneumothorax.  No history of recurrent hernias.   No history of lens dislocation or high myopia.  Her glasses prescription is -1.5 in both eyes.    ECHO 2/8/25 \"Left Ventricle: The left ventricular systolic function is normal, with a Emmanuel's biplane calculated ejection fraction of 63%. There are no regional left ventricular wall motion abnormalities. The left ventricular cavity size is normal. There is normal septal and normal posterior left ventricular wall thickness. Spectral Doppler shows a normal pattern of left ventricular diastolic filling.  Left Atrium: The left atrial size is normal.  Right Ventricle: The right ventricle is normal in size. There is normal right ventricular global systolic function.  Right Atrium: The right atrium is normal in size.  Aortic Valve: The aortic valve is trileaflet. The aortic valve dimensionless index is 0.64. There is no evidence of aortic valve regurgitation. The peak instantaneous gradient of the aortic valve is 9 mmHg. The mean gradient of the aortic valve is 4 mmHg.  Mitral Valve: The mitral " "valve is normal in structure. There is trace mitral valve regurgitation.  Tricuspid Valve: The tricuspid valve is structurally normal. There is trace tricuspid regurgitation. The Doppler estimated RVSP is within normal limits at 19.3 mmHg.  Pulmonic Valve: The pulmonic valve is structurally normal. There is mild pulmonic valve regurgitation.  Pericardium: There is no pericardial effusion noted.  Aorta: The aortic root is normal.  Systemic Veins: The inferior vena cava appears normal in size.\"    TSH 24 was wnl.      Past Surgical History:  Elsy  has a past surgical history that includes [place] tooth extraction ().    Developmental & School History: Her mother states that she was told that she had some words and then talked in sentences.  No history of developmental delays.     Pregnancy History: no prior pregnancies    Social History: Lives with wife.    Sleep History: No snoring at night.      Family History:  A multigenerational family history was obtained as part of the visit and pertinent positives and negatives are reviewed here.  The complete pedigree will be scanned into the patient EHR.   Her mother is in her 50's and has a history of raynaud's and osteoporosis.  Her father is in his 50's and has a history of high BP, smoking and alcohol abuse.  Her brother is 31yo and has a history of anxiety and depression.   On her mother's side, her grandmother has a history of dissociative identify disorder.  Her MGF has a history of HIV.  Her maternal first cousin possibly has autism.  Her maternal uncle is A+W.  On the paternal side of the family, her grandmother  in her 70's and had colorectal cancer, diabetes, and renal failure.  The health history of her PGF is unknown to the informant.  Her paternal half uncle is A+W.  Her paternal aunt has a history of diabetes. Her paternal first cousin has a history of asthma.   The remainder of the history is negative for congenital anomalies, POTS, PTX, " cardiac issues, deafness, blindness, and known genetic diseases.  Consanguinity is denied. Ancestry is white on the maternal side of the family and Native American [place].    Review of Systems:  A full review of systems was reviewed with the patient.  Pertinent positives listed in the HPI.  All other systems negative.      MEDICATIONS:     Current Outpatient Medications:     albuterol 90 mcg/actuation inhaler, Inhale 2 puffs 3 times a day., Disp: 18 g, Rfl: 1    hydrOXYzine pamoate (Vistaril) 25 mg capsule, Take 1 capsule by mouth three times daily as needed for anxiety., Disp: 90 capsule, Rfl: 0    levonorgestrel (Mirena) 20 mcg/24hr IUD, 52 mg by intrauterine route., Disp: , Rfl:     lisdexamfetamine (Vyvanse) 50 mg capsule, Take 1 capsule (50 mg) by mouth once daily in the morning., Disp: 30 capsule, Rfl: 0    montelukast (Singulair) 10 mg tablet, Take 1 tablet by mouth once daily at bedtime., Disp: 30 tablet, Rfl: 4    sertraline (Zoloft) 100 mg tablet, Take 2 tablets (200 mg) by mouth once daily., Disp: 90 tablet, Rfl: 3    traZODone (Desyrel) 50 mg tablet, Take 1 tablet by mouth once daily at bedtime., Disp: 90 tablet, Rfl: 0    propranolol (Inderal) 20 mg tablet, Take 1 tablet (20 mg) by mouth 2 times a day as needed (anxiety, palpitations)., Disp: 60 tablet, Rfl: 1    ALLERGIES:   Elsy is allergic to griseofulvin, bee venom protein (honey bee), and adhesive tape-silicones.     Objective:     Physical Exam  Beighton score   1. Passive dorsiflexion and hyperextension of the fifth MCP joint beyond 90°: 0  2. Passive apposition of the thumb to the flexor aspect of the forearm: 2  3. Passive hyperextension of the elbow beyond 10°: 2  4. Passive hyperextension of the knee beyond 10°: 2  5. Active forward flexion of the trunk with the knees fully extended so that the palms of the hands rest flat on the floor: 1  Total   7/9    HEENT Normal anterior hair distribution; symmetric face; pupils equal and round  bilaterally; ears normally formed set and rotated; normal nose; palate intact; uvula single and midline.  Symmetric facial movements.  Dentition is present.   Neck Supple with no extra skin or webbing.   Chest Clear to auscultation bilaterally; no pectus deformity.   CV Regular rate and rhythm with no murmurs.    Abdomen Soft, NT to palpation; bowel sounds present.   Back No sacral ad or dimples.   Extremities Normally formed digits with normal nails and creases; Wrist sign negative. Thumb sign negative. Bilateral piezogenic papules. Hindfoot deformity-, flat feet-.   Skin No areas of hyper or hypopigmentation or rashes.  Skin hyperextensibility not present using volar surface of nondominant forearm, Straie on abdomen   Neuro Alert, casual gait wnl.       Assessment and Plan:     Ania is a 29 y.o. adult with chronic arthralgias, myalgia, and hypermobility.  On exam, her Beighton score today was a 7 out of 9.  She has generalized hypermobility. Explained that there is currently no associated gene for hypermobile Tawny-Danlos syndrome (hEDS), so no genetic testing can be offered for this.  She has a history of an palate expander.  ECHO did not show evidence of aortic root dilatation or mitral valve issues.  She does not have skin hypermobility, severe myopia, bifid uvula, or arachnodactyly.  She has been evaluated by rheumatology in the past and is not thought to have a rheumatological condition.  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 With her dizziness upon standing or when sitting up quickly, will refer her to cardiology for an evaluation. Discussed [facility].  She states that may be interested and requested that the referral be placed for them.  Discussed how some people with HSD have benefited from aquatic therapy and PT.  She declined a PT referral at this time.        - Cardiology referral  - [facility]      We would like Elsy to  follow up in clinic if new questions or concerns arise. An appointment can be made by calling [phone].     All results will be discussed with the patient/family at the scheduled follow-up appointment unless other arrangements are made at the time of the visit.      The information we discussed is what is known as of this date. With the rapid pace of medical and genetic research, new discoveries may modify our assessment and approach to this patient and/or family in the future.     All of the patient's questions were answered and contact information was provided.       Thank you for involving us with the care of [facility].      This was a clinical encounter in which I spent greater than 90 minutes engaged in activities related to this visit which included records review, preparing to see the patient, pedigree analysis, completing the evaluation, counseling, documentation, and coordination.  We discussed hypermobile Tawny Danlos syndrome (hEDS), genetic principles, and lack of genetic testing options for hEDS.    Vernell Manley MD  Medical Geneticist

## 2025-03-14 NOTE — PATIENT INSTRUCTIONS
Thank you for visiting the  Genetics Clinic.     Today, we discussed hypermobile joints and HSD. Questions and concerns were addressed. The plan was reviewed as outlined below.    1) Cardiology evaluation  2) [facility] referral placed    The clinic note with full evaluation and today's final recommendations are to be sent to the referring physician/PCP.    Please call [phone] to schedule Genetics follow-up iif other concerns arise.    Vernell Manley MD  Genetic Medicine

## 2025-03-14 NOTE — TELEPHONE ENCOUNTER
Patient verified name and date of birth at start of call. Nurse made patient aware of Dr. Wilson being open to giving colpo in OR and sending 1x dose of ativan due to her anxiety. Patient states that she's fine having colpo outpatient as long as she's able to have local anesthetic and would like 1x dose of ativan. Dr. Wilson made aware. Patient verbalized understanding of conversation and had no further questions, comments or concerns at this time.    YVON Pollard

## 2025-03-14 NOTE — LETTER
03/14/25    Zarina Grigsby DO  [address] [facility] [address]      Dear Dr. Zarina Grigsby DO,    I am writing to confirm that your patient, Ania Claire  received care in my office on 03/14/25. I have enclosed a summary of the care provided to Ania for your reference.    Please contact me with any questions you may have regarding the visit.    Sincerely,         Vernell Manley MD  [address]  [facility] [address]  [facility] [address] [phone]    CC: No Recipients

## 2025-03-20 DIAGNOSIS — F32.A ANXIETY AND DEPRESSION: ICD-10-CM

## 2025-03-20 DIAGNOSIS — F41.9 ANXIETY AND DEPRESSION: ICD-10-CM

## 2025-03-21 RX ORDER — HYDROXYZINE PAMOATE 25 MG/1
CAPSULE ORAL
Qty: 90 CAPSULE | Refills: 0 | Status: SHIPPED | OUTPATIENT
Start: 2025-03-21

## 2025-03-25 ENCOUNTER — OFFICE VISIT (OUTPATIENT)
Dept: CARDIOLOGY | Facility: CLINIC | Age: 30
End: 2025-03-25
Payer: COMMERCIAL

## 2025-03-25 VITALS
DIASTOLIC BLOOD PRESSURE: 74 MMHG | WEIGHT: 198 LBS | HEIGHT: 67 IN | BODY MASS INDEX: 31.08 KG/M2 | OXYGEN SATURATION: 97 % | SYSTOLIC BLOOD PRESSURE: 138 MMHG | HEART RATE: 124 BPM

## 2025-03-25 DIAGNOSIS — F41.9 ANXIETY AND DEPRESSION: ICD-10-CM

## 2025-03-25 DIAGNOSIS — R42 DIZZINESS ON STANDING: ICD-10-CM

## 2025-03-25 DIAGNOSIS — F32.A ANXIETY AND DEPRESSION: ICD-10-CM

## 2025-03-25 DIAGNOSIS — M35.7 HYPERMOBILITY SYNDROME: Primary | ICD-10-CM

## 2025-03-25 DIAGNOSIS — R00.2 PALPITATIONS: ICD-10-CM

## 2025-03-25 PROCEDURE — 93010 ELECTROCARDIOGRAM REPORT: CPT | Performed by: STUDENT IN AN ORGANIZED HEALTH CARE EDUCATION/TRAINING PROGRAM

## 2025-03-25 PROCEDURE — 3008F BODY MASS INDEX DOCD: CPT

## 2025-03-25 PROCEDURE — 93005 ELECTROCARDIOGRAM TRACING: CPT

## 2025-03-25 PROCEDURE — 99214 OFFICE O/P EST MOD 30 MIN: CPT

## 2025-03-25 PROCEDURE — 99244 OFF/OP CNSLTJ NEW/EST MOD 40: CPT

## 2025-03-25 PROCEDURE — 1036F TOBACCO NON-USER: CPT

## 2025-03-25 RX ORDER — PROPRANOLOL HYDROCHLORIDE 20 MG/1
20 TABLET ORAL 2 TIMES DAILY
Qty: 60 TABLET | Refills: 1 | Status: SHIPPED | OUTPATIENT
Start: 2025-03-25 | End: 2025-09-21

## 2025-03-25 ASSESSMENT — ENCOUNTER SYMPTOMS: DIZZINESS: 1

## 2025-03-25 NOTE — LETTER
March 25, 2025     Vernell Manley MD  [address]   [facility] [address]    Patient: Ania Claire   YOB: 1995   Date of Visit: 3/25/2025       Dear Dr. Vernell Manley MD:    Thank you for referring Ania Claire to me for evaluation. Below are my notes for this consultation.  If you have questions, please do not hesitate to call me. I look forward to following your patient along with you.       Sincerely,     Tana Gonsalez, APRN-CNP      CC: No Recipients  ______________________________________________________________________________________    Referred by Dr. Manley for New Patient Visit     History Of Present Illness:    Ania Claire is a 29 y.o. adult presenting with PMH of Asthma, ADHD, Anxiety who presents today for evaluation for POTS.    Ania is accompanied by her wife today. In terms of pronouns, Ania uses she and they. She reports symptoms of heat intolerance, dizziness with positional changes (bending over) that has been going on for at least 10 years. She occasionally uses propanolol for anxiety but is not sure if has helped her symptoms. She drinks at least 2 L of carbonated water a day and less than 1 cup of coffee per day. She denies energy drinks. She denies worsening SOB. She denies chest pain. Pt denies worsening lower extremity edema.  Pt denies palpitations or syncope.    Review of Systems   Endocrine: Positive for heat intolerance.   Neurological:  Positive for dizziness.   All other systems reviewed and are negative.    Past Medical History:  Ania has a past medical history of ADHD (attention deficit hyperactivity disorder) (Chilhood but treated starting this jan), Allergic (seasonal and bugs), Anxiety (Childhood), Asthma (Around age 18), Depression (Childhood), GERD (gastroesophageal reflux disease) (Mild), and Visual impairment (I wear glasses since I was a kid).    Past Surgical History:  Ania has a past surgical history that  "includes [place] tooth extraction (march of 22).      Social History:  Ania reports that Ania has never smoked. Ania has never used smokeless tobacco. Ania reports that Ania does not currently use alcohol. Ania reports current drug use. Frequency: 7.00 times per week. Drug: Marijuana.     Family History:  Family History   Problem Relation Name Age of Onset   • Arthritis Mother Arelis Antunez    • Cancer Mother Arelis Antunez    • Depression Mother Arelis Antunez    • Hypertension Mother Arelis Antunez    • Mental illness Mother Arelis Antunez    • Hypertension Father Ryan Claire    • Mental illness Father Ryan Claire    • Arthritis Maternal Grandmother Arelis Antunez    • Depression Maternal Grandmother Arelis Antunez    • Mental illness Maternal Grandmother Arelis Antunez    • Cancer Paternal Grandmother Adele Claire    • Depression Brother Patrick Claire    • Mental illness Brother Patrick Claire         Allergies:  Griseofulvin, Bee venom protein (honey bee), and Adhesive tape-silicones    Outpatient Medications:  Current Outpatient Medications   Medication Instructions   • albuterol 90 mcg/actuation inhaler 2 puffs, inhalation, 3 times daily RT   • hydrOXYzine pamoate (Vistaril) 25 mg capsule Take 1 capsule by mouth three times daily as needed for anxiety.   • levonorgestrel (Mirena) 20 mcg/24hr IUD 1 each   • lisdexamfetamine (VYVANSE) 50 mg, oral, Every morning   • LORazepam (ATIVAN) 1 mg, oral, Once as needed, Take 30 minutes to 1 hour prior to procedure.   • montelukast (SINGULAIR) 10 mg, oral, Nightly   • propranolol (INDERAL) 20 mg, oral, 2 times daily PRN   • sertraline (ZOLOFT) 200 mg, oral, Daily   • traZODone (DESYREL) 50 mg, oral, Nightly        Last Recorded Vitals:  Vitals:    03/25/25 1416   BP: 138/74   Pulse: (!) 124   SpO2: 97%   Weight: 89.8 kg (198 lb)   Height: 1.702 m (5' 7\")       Physical Exam  Constitutional:       Appearance: Normal appearance.   Neck:      Vascular: No " "carotid bruit.   Cardiovascular:      Rate and Rhythm: Normal rate and regular rhythm.      Heart sounds: No murmur heard.  Pulmonary:      Effort: Pulmonary effort is normal.      Breath sounds: Normal breath sounds.   Abdominal:      Palpations: Abdomen is soft.   Skin:     General: Skin is warm.   Neurological:      Mental Status: Ania is alert and oriented to person, place, and time.   Psychiatric:         Mood and Affect: Mood normal.       Last Labs:  CBC -  Lab Results   Component Value Date    WBC 7.9 01/21/2025    HGB 13.6 01/21/2025    HCT 41.4 01/21/2025    MCV 88 01/21/2025     01/21/2025       CMP -  Lab Results   Component Value Date    CALCIUM 9.3 01/21/2025    PROT 7.5 01/21/2025    ALBUMIN 4.6 01/21/2025    AST 18 01/21/2025    ALT 28 01/21/2025    ALKPHOS 42 01/21/2025    BILITOT 0.4 01/21/2025       LIPID PANEL -   No results found for: \"CHOL\", \"TRIG\", \"HDL\", \"CHHDL\", \"LDLF\", \"VLDL\", \"NHDL\"    RENAL FUNCTION PANEL -   Lab Results   Component Value Date    GLUCOSE 103 (H) 01/21/2025     01/21/2025    K 4.0 01/21/2025     01/21/2025    CO2 25 01/21/2025    ANIONGAP 13 01/21/2025    BUN 11 01/21/2025    CREATININE 0.83 01/21/2025    CALCIUM 9.3 01/21/2025    ALBUMIN 4.6 01/21/2025        Lab Results   Component Value Date    HGBA1C 5.1 01/21/2025       Last Cardiology Tests:  ECG:  Sinus Tachycardia 124 bpm  Echo:  Transthoracic Echo (TTE) Complete 02/18/2025  CONCLUSIONS:   1. The left ventricular systolic function is normal, with a Emmanuel's biplane calculated ejection fraction of 63%.   2. Right ventricular systolic pressure is within normal limits.     Ejection Fractions:  EF   Date/Time Value Ref Range Status   02/18/2025 09:15 AM 63 %      Assessment/Plan  Ania Claire is a 29 y.o. adult presenting with PMH of Asthma, ADHD, Anxiety who presents today for evaluation for POTS.    Ania is accompanied by her wife today. In terms of pronouns, Ania uses she and they. She " reports symptoms of heat intolerance, dizziness with positional changes (bending over) that has been going on for at least 10 years. She occasionally uses propanolol for anxiety but is not sure if has helped her symptoms. She drinks at least 2 L of carbonated water a day and less than 1 cup of coffee per day. She denies energy drinks. Today she is normotensive. EKG is Sinus Tachycardia .   Most recent TSH was normal in January 2025. She has mild anemia d/t menorrhagia for which she occasionally takes oral iron. Most recent TTE with normal LV function and normal valvular function.     Plan  - Tilt table test  - Take Propanolol 20 mg 1 tab in the morning and 1 tab in the evening.   - Check BP 2 hours after propanol in the morning.   - Follow up in 8 weeks virtually       DAINA Byrne-CNP

## 2025-03-25 NOTE — PATIENT INSTRUCTIONS
Ania,    Compression stocking daily 30-40 mmHG daily    Tilt Table Test    Increase your propanolol to twice daily    Check your BP daily 2 hours after taking Propanol     Follow up virtually after tilt table results    Tana CERVANTES

## 2025-03-25 NOTE — PROGRESS NOTES
Referred by Dr. Manley for New Patient Visit     History Of Present Illness:    Ania Claire is a 29 y.o. adult presenting with PMH of Asthma, ADHD, Anxiety who presents today for evaluation for POTS.    Ania is accompanied by her wife today. In terms of pronouns, Ania uses she and they. She reports symptoms of heat intolerance, dizziness with positional changes (bending over) that has been going on for at least 10 years. She occasionally uses propanolol for anxiety but is not sure if has helped her symptoms. She drinks at least 2 L of carbonated water a day and less than 1 cup of coffee per day. She denies energy drinks. She denies worsening SOB. She denies chest pain. Pt denies worsening lower extremity edema.  Pt denies palpitations or syncope.    Review of Systems   Endocrine: Positive for heat intolerance.   Neurological:  Positive for dizziness.   All other systems reviewed and are negative.    Past Medical History:  Ania has a past medical history of ADHD (attention deficit hyperactivity disorder) (Chilhood but treated starting this jan), Allergic (seasonal and bugs), Anxiety (Childhood), Asthma (Around age 18), Depression (Childhood), GERD (gastroesophageal reflux disease) (Mild), and Visual impairment (I wear glasses since I was a kid).    Past Surgical History:  Ania has a past surgical history that includes [employer] tooth extraction (march of 22).      Social History:  Ania reports that Ania has never smoked. Ania has never used smokeless tobacco. Ania reports that Ania does not currently use alcohol. Ania reports current drug use. Frequency: 7.00 times per week. Drug: Marijuana.     Family History:  Family History   Problem Relation Name Age of Onset    Arthritis Mother Arelis Antunez     Cancer Mother Arelis Antunez     Depression Mother Arelis Antunez     Hypertension Mother Arelis Antunez     Mental illness Mother Arelis Antunez     Hypertension Father Ryan Claire     Mental illness  "Father Ryan Claire     Arthritis Maternal Grandmother Arelis Antunez     Depression Maternal Grandmother Arelis Antunez     Mental illness Maternal Grandmother Arelis Antunez     Cancer Paternal Grandmother Adele Claire     Depression Brother Patrick Claire     Mental illness Brother Patrick Claire         Allergies:  Griseofulvin, Bee venom protein (honey bee), and Adhesive tape-silicones    Outpatient Medications:  Current Outpatient Medications   Medication Instructions    albuterol 90 mcg/actuation inhaler 2 puffs, inhalation, 3 times daily RT    hydrOXYzine pamoate (Vistaril) 25 mg capsule Take 1 capsule by mouth three times daily as needed for anxiety.    levonorgestrel (Mirena) 20 mcg/24hr IUD 1 each    lisdexamfetamine (VYVANSE) 50 mg, oral, Every morning    LORazepam (ATIVAN) 1 mg, oral, Once as needed, Take 30 minutes to 1 hour prior to procedure.    montelukast (SINGULAIR) 10 mg, oral, Nightly    propranolol (INDERAL) 20 mg, oral, 2 times daily PRN    sertraline (ZOLOFT) 200 mg, oral, Daily    traZODone (DESYREL) 50 mg, oral, Nightly        Last Recorded Vitals:  Vitals:    03/25/25 1416   BP: 138/74   Pulse: (!) 124   SpO2: 97%   Weight: 89.8 kg (198 lb)   Height: 1.702 m (5' 7\")       Physical Exam  Constitutional:       Appearance: Normal appearance.   Neck:      Vascular: No carotid bruit.   Cardiovascular:      Rate and Rhythm: Normal rate and regular rhythm.      Heart sounds: No murmur heard.  Pulmonary:      Effort: Pulmonary effort is normal.      Breath sounds: Normal breath sounds.   Abdominal:      Palpations: Abdomen is soft.   Skin:     General: Skin is warm.   Neurological:      Mental Status: Ania is alert and oriented to person, place, and time.   Psychiatric:         Mood and Affect: Mood normal.       Last Labs:  CBC -  Lab Results   Component Value Date    WBC 7.9 01/21/2025    HGB 13.6 01/21/2025    HCT 41.4 01/21/2025    MCV 88 01/21/2025     01/21/2025       CMP -  Lab " "Results   Component Value Date    CALCIUM 9.3 01/21/2025    PROT 7.5 01/21/2025    ALBUMIN 4.6 01/21/2025    AST 18 01/21/2025    ALT 28 01/21/2025    ALKPHOS 42 01/21/2025    BILITOT 0.4 01/21/2025       LIPID PANEL -   No results found for: \"CHOL\", \"TRIG\", \"HDL\", \"CHHDL\", \"LDLF\", \"VLDL\", \"NHDL\"    RENAL FUNCTION PANEL -   Lab Results   Component Value Date    GLUCOSE 103 (H) 01/21/2025     01/21/2025    K 4.0 01/21/2025     01/21/2025    CO2 25 01/21/2025    ANIONGAP 13 01/21/2025    BUN 11 01/21/2025    CREATININE 0.83 01/21/2025    CALCIUM 9.3 01/21/2025    ALBUMIN 4.6 01/21/2025        Lab Results   Component Value Date    HGBA1C 5.1 01/21/2025       Last Cardiology Tests:  ECG:  Sinus Tachycardia 124 bpm  Echo:  Transthoracic Echo (TTE) Complete 02/18/2025  CONCLUSIONS:   1. The left ventricular systolic function is normal, with a Emmanuel's biplane calculated ejection fraction of 63%.   2. Right ventricular systolic pressure is within normal limits.     Ejection Fractions:  EF   Date/Time Value Ref Range Status   02/18/2025 09:15 AM 63 %      Assessment/Plan   Ania Claire is a 29 y.o. adult presenting with PMH of Asthma, ADHD, Anxiety who presents today for evaluation for POTS.    Ania is accompanied by her wife today. In terms of pronouns, Ania uses she and they. She reports symptoms of heat intolerance, dizziness with positional changes (bending over) that has been going on for at least 10 years. She occasionally uses propanolol for anxiety but is not sure if has helped her symptoms. She drinks at least 2 L of carbonated water a day and less than 1 cup of coffee per day. She denies energy drinks. Today she is normotensive. EKG is Sinus Tachycardia .   Most recent TSH was normal in January 2025. She has mild anemia d/t menorrhagia for which she occasionally takes oral iron. Most recent TTE with normal LV function and normal valvular function.     Plan  - Tilt table test  - Take " Propanolol 20 mg 1 tab in the morning and 1 tab in the evening.   - Check BP 2 hours after propanol in the morning.   - Follow up in 8 weeks virtually       DAINA Byrne-CNP

## 2025-03-26 ENCOUNTER — APPOINTMENT (OUTPATIENT)
Dept: OBSTETRICS AND GYNECOLOGY | Facility: CLINIC | Age: 30
End: 2025-03-26
Payer: COMMERCIAL

## 2025-03-26 VITALS
BODY MASS INDEX: 31.71 KG/M2 | WEIGHT: 202 LBS | HEIGHT: 67 IN | SYSTOLIC BLOOD PRESSURE: 123 MMHG | DIASTOLIC BLOOD PRESSURE: 76 MMHG

## 2025-03-26 DIAGNOSIS — R87.810 ASCUS WITH POSITIVE HIGH RISK HPV CERVICAL: ICD-10-CM

## 2025-03-26 DIAGNOSIS — R10.2 FEMALE PELVIC PAIN: ICD-10-CM

## 2025-03-26 DIAGNOSIS — N93.9 ABNORMAL UTERINE BLEEDING (AUB): Primary | ICD-10-CM

## 2025-03-26 DIAGNOSIS — Z11.3 SCREEN FOR STD (SEXUALLY TRANSMITTED DISEASE): ICD-10-CM

## 2025-03-26 DIAGNOSIS — R87.610 ASCUS WITH POSITIVE HIGH RISK HPV CERVICAL: ICD-10-CM

## 2025-03-26 LAB
ATRIAL RATE: 124 BPM
P AXIS: 48 DEGREES
P OFFSET: 205 MS
P ONSET: 151 MS
PR INTERVAL: 138 MS
PREGNANCY TEST URINE, POC: NEGATIVE
Q ONSET: 220 MS
QRS COUNT: 21 BEATS
QRS DURATION: 76 MS
QT INTERVAL: 322 MS
QTC CALCULATION(BAZETT): 462 MS
QTC FREDERICIA: 410 MS
R AXIS: 55 DEGREES
T AXIS: -21 DEGREES
T OFFSET: 381 MS
VENTRICULAR RATE: 124 BPM

## 2025-03-26 PROCEDURE — 88342 IMHCHEM/IMCYTCHM 1ST ANTB: CPT | Performed by: STUDENT IN AN ORGANIZED HEALTH CARE EDUCATION/TRAINING PROGRAM

## 2025-03-26 PROCEDURE — 88305 TISSUE EXAM BY PATHOLOGIST: CPT

## 2025-03-26 PROCEDURE — 88305 TISSUE EXAM BY PATHOLOGIST: CPT | Performed by: STUDENT IN AN ORGANIZED HEALTH CARE EDUCATION/TRAINING PROGRAM

## 2025-03-26 PROCEDURE — 88342 IMHCHEM/IMCYTCHM 1ST ANTB: CPT

## 2025-03-26 PROCEDURE — 99215 OFFICE O/P EST HI 40 MIN: CPT | Performed by: STUDENT IN AN ORGANIZED HEALTH CARE EDUCATION/TRAINING PROGRAM

## 2025-03-26 PROCEDURE — 0760T DGTZ GLS MCRSCP SL IMM 1ST: CPT

## 2025-03-26 PROCEDURE — 57454 BX/CURETT OF CERVIX W/SCOPE: CPT | Performed by: STUDENT IN AN ORGANIZED HEALTH CARE EDUCATION/TRAINING PROGRAM

## 2025-03-26 PROCEDURE — 0753T DGTZ GLS MCRSCP SLD LEVEL IV: CPT

## 2025-03-26 PROCEDURE — 81025 URINE PREGNANCY TEST: CPT | Performed by: STUDENT IN AN ORGANIZED HEALTH CARE EDUCATION/TRAINING PROGRAM

## 2025-03-26 SDOH — ECONOMIC STABILITY: TRANSPORTATION INSECURITY
IN THE PAST 12 MONTHS, HAS LACK OF TRANSPORTATION KEPT YOU FROM MEETINGS, WORK, OR FROM GETTING THINGS NEEDED FOR DAILY LIVING?: NO

## 2025-03-26 SDOH — ECONOMIC STABILITY: TRANSPORTATION INSECURITY
IN THE PAST 12 MONTHS, HAS THE LACK OF TRANSPORTATION KEPT YOU FROM MEDICAL APPOINTMENTS OR FROM GETTING MEDICATIONS?: NO

## 2025-03-26 ASSESSMENT — PAIN SCALES - GENERAL: PAINLEVEL_OUTOF10: 0-NO PAIN

## 2025-03-26 ASSESSMENT — ENCOUNTER SYMPTOMS
NEUROLOGICAL NEGATIVE: 0
CARDIOVASCULAR NEGATIVE: 0
DEPRESSION: 0
CONSTITUTIONAL NEGATIVE: 0
EYES NEGATIVE: 0
ALLERGIC/IMMUNOLOGIC NEGATIVE: 0
LOSS OF SENSATION IN FEET: 0
OCCASIONAL FEELINGS OF UNSTEADINESS: 0
GASTROINTESTINAL NEGATIVE: 0
PSYCHIATRIC NEGATIVE: 0
RESPIRATORY NEGATIVE: 0
ENDOCRINE NEGATIVE: 0
MUSCULOSKELETAL NEGATIVE: 0
HEMATOLOGIC/LYMPHATIC NEGATIVE: 0

## 2025-03-26 ASSESSMENT — SOCIAL DETERMINANTS OF HEALTH (SDOH)
IN THE PAST 12 MONTHS, HAS THE ELECTRIC, GAS, OIL, OR WATER COMPANY THREATENED TO SHUT OFF SERVICE IN YOUR HOME?: NO
HOW HARD IS IT FOR YOU TO PAY FOR THE VERY BASICS LIKE FOOD, HOUSING, MEDICAL CARE, AND HEATING?: NOT VERY HARD
WITHIN THE LAST YEAR, HAVE YOU BEEN HUMILIATED OR EMOTIONALLY ABUSED IN OTHER WAYS BY YOUR PARTNER OR EX-PARTNER?: NO
WITHIN THE LAST YEAR, HAVE YOU BEEN AFRAID OF YOUR PARTNER OR EX-PARTNER?: NO
WITHIN THE LAST YEAR, HAVE TO BEEN RAPED OR FORCED TO HAVE ANY KIND OF SEXUAL ACTIVITY BY YOUR PARTNER OR EX-PARTNER?: NO
WITHIN THE LAST YEAR, HAVE YOU BEEN KICKED, HIT, SLAPPED, OR OTHERWISE PHYSICALLY HURT BY YOUR PARTNER OR EX-PARTNER?: NO

## 2025-03-26 ASSESSMENT — LIFESTYLE VARIABLES
HOW OFTEN DO YOU HAVE SIX OR MORE DRINKS ON ONE OCCASION: NEVER
HOW MANY STANDARD DRINKS CONTAINING ALCOHOL DO YOU HAVE ON A TYPICAL DAY: PATIENT DOES NOT DRINK
HOW OFTEN DO YOU HAVE A DRINK CONTAINING ALCOHOL: NEVER
AUDIT-C TOTAL SCORE: 0
SKIP TO QUESTIONS 9-10: 1

## 2025-03-26 NOTE — PROGRESS NOTES
"Subjective   Patient ID: Elsy Claire \"Ania\" is a 29 y.o. adult who presents for New Patient Visit (Patient here for a colposcopy, no pain or falls, no complaints or concerns last pap 2/18/25 ASCUS HPV positive chaperone needed).  Patient here for colposcopy given ASCUS HPV positive Pap.  Patient also reporting long history of abnormal uterine bleeding and pelvic pain.  Bleeding pattern is 5 to 7 days/month with passage of clots and bleeding onto clothes.  Regarding her pain she reports that her periods have always been very painful and sometimes cause her to vomit and and miss work/school.  She denies dysuria but does report intermittent dyspareunia.  She is interested in hysterectomy as she has been unsuccessful in controlling the symptoms with the Mirena IUD ibuprofen.          Review of Systems   All other systems reviewed and are negative.      Objective   Physical Exam  Vitals reviewed.   Constitutional:       General: Ania is not in acute distress.     Appearance: Ania is not ill-appearing.   Pulmonary:      Effort: Pulmonary effort is normal.   Skin:     Coloration: Skin is not pale.   Neurological:      Mental Status: Ania is alert.         Assessment/Plan   Diagnoses and all orders for this visit:  Abnormal uterine bleeding (AUB)  -     US PELVIS TRANSABDOMINAL WITH TRANSVAGINAL; Future  -     POCT pregnancy, urine manually resulted  Screen for STD (sexually transmitted disease)  -     C. trachomatis / N. gonorrhoeae, Amplified, Urogenital; Future  -     Trichomonas vaginalis, Amplified; Future  -     Hepatitis B Surface Antigen; Future  -     Hepatitis C Antibody; Future  -     HIV 1/2 Antigen/Antibody Screen with Reflex to Confirmation; Future  -     Syphilis Screen with Reflex; Future  Female pelvic pain  -     US PELVIS TRANSABDOMINAL WITH TRANSVAGINAL; Future  ASCUS with positive high risk HPV cervical  -     Surgical Pathology Exam  -     Surgical Pathology Exam    Patient here for " "colposcopy as below.  Also discussed abnormal uterine bleeding and pelvic pain with patient.  Reports AUB that has been refractory to medical management and is interested in hysterectomy.  Will obtain pelvic ultrasound and bring patient back for counseling and exam.     Patient ID: Elsy Claire \"Luciano" is a 29 y.o. adult.    Colposcopy    Date/Time: 3/26/2025 3:38 PM    Performed by: Guillermo Wilson MD  Authorized by: Guillermo Wilson MD    Procedure location: cervix    Consent:     Patient questions answered: yes      Risks and benefits of the procedure and its alternatives discussed: yes      Procedural risks discussed:  Bleeding, infection and repeat procedure    Consent obtained:  Verbal and written    Consent given by:  Patient  Indication:     Cervical indication(s): high-risk HPV positive and ASCUS    Pre-procedure:     Speculum was placed in the vagina: yes      Prep solution(s): acetic acid      Local anesthetic:  Lidocaine 2% w/o epi    Anesthetic total (ml): 1.  Procedure:     Colposcopy with: cervical biopsy      Biopsy taken: yes      # of biopsies:  2    Biopsy location(s): 9, 12    Cervix visibility: fully visualized      SCJ visibility: fully visualized      Lesion visualized: fully visualized      Acetowhite lesion(s): cervix      Acetowhite lesion(s) description:  12    Vascular changes: cervix      Vascular lesion description:  9    Cervical impression: low grade      Ferric subsulfate solution applied: yes      Tampon inserted: no    Post-procedure:     Patient tolerance of procedure:  Patient tolerated the procedure well with no immediate complications         Guillermo Wilson MD 03/26/25 10:46 AM   "

## 2025-03-27 ENCOUNTER — PATIENT MESSAGE (OUTPATIENT)
Dept: PRIMARY CARE | Facility: CLINIC | Age: 30
End: 2025-03-27
Payer: COMMERCIAL

## 2025-03-27 DIAGNOSIS — J45.20 MILD INTERMITTENT ASTHMA, UNSPECIFIED WHETHER COMPLICATED (HHS-HCC): Primary | ICD-10-CM

## 2025-03-27 RX ORDER — METHYLPREDNISOLONE 4 MG/1
TABLET ORAL
Qty: 21 TABLET | Refills: 0 | Status: SHIPPED | OUTPATIENT
Start: 2025-03-27 | End: 2025-04-02

## 2025-03-28 DIAGNOSIS — R00.2 PALPITATIONS: ICD-10-CM

## 2025-03-28 DIAGNOSIS — R42 DIZZINESS ON STANDING: Primary | ICD-10-CM

## 2025-03-28 DIAGNOSIS — M35.7 HYPERMOBILITY SYNDROME: ICD-10-CM

## 2025-04-01 ENCOUNTER — APPOINTMENT (OUTPATIENT)
Dept: RADIOLOGY | Facility: HOSPITAL | Age: 30
End: 2025-04-01
Payer: COMMERCIAL

## 2025-04-01 DIAGNOSIS — R10.2 FEMALE PELVIC PAIN: ICD-10-CM

## 2025-04-01 DIAGNOSIS — N93.9 ABNORMAL UTERINE BLEEDING (AUB): ICD-10-CM

## 2025-04-01 PROCEDURE — 76856 US EXAM PELVIC COMPLETE: CPT | Performed by: RADIOLOGY

## 2025-04-01 PROCEDURE — 76830 TRANSVAGINAL US NON-OB: CPT | Performed by: RADIOLOGY

## 2025-04-01 PROCEDURE — 76830 TRANSVAGINAL US NON-OB: CPT

## 2025-04-04 LAB
HBV SURFACE AG SERPL QL IA: NORMAL
HCV AB SERPL QL IA: NORMAL
HIV 1+2 AB+HIV1 P24 AG SERPL QL IA: NORMAL
T PALLIDUM AB SER QL IA: NEGATIVE

## 2025-04-06 DIAGNOSIS — J45.20 MILD INTERMITTENT EXTRINSIC ASTHMA WITHOUT COMPLICATION (HHS-HCC): ICD-10-CM

## 2025-04-07 ENCOUNTER — APPOINTMENT (OUTPATIENT)
Dept: PRIMARY CARE | Facility: CLINIC | Age: 30
End: 2025-04-07
Payer: COMMERCIAL

## 2025-04-07 DIAGNOSIS — R42 DIZZINESS ON STANDING: Primary | ICD-10-CM

## 2025-04-07 DIAGNOSIS — Z91.89 COMPLIANCE WITH MEDICATION REGIMEN: ICD-10-CM

## 2025-04-07 DIAGNOSIS — F90.0 ATTENTION DEFICIT HYPERACTIVITY DISORDER (ADHD), PREDOMINANTLY INATTENTIVE TYPE: ICD-10-CM

## 2025-04-07 DIAGNOSIS — F51.01 PRIMARY INSOMNIA: ICD-10-CM

## 2025-04-07 DIAGNOSIS — R00.2 PALPITATIONS: ICD-10-CM

## 2025-04-07 DIAGNOSIS — M25.50 POLYARTHRALGIA: ICD-10-CM

## 2025-04-07 DIAGNOSIS — M24.80 GENERALIZED HYPERMOBILITY OF JOINTS: ICD-10-CM

## 2025-04-07 DIAGNOSIS — M35.7 HYPERMOBILITY SYNDROME: ICD-10-CM

## 2025-04-07 PROCEDURE — 1036F TOBACCO NON-USER: CPT | Performed by: STUDENT IN AN ORGANIZED HEALTH CARE EDUCATION/TRAINING PROGRAM

## 2025-04-07 PROCEDURE — 99214 OFFICE O/P EST MOD 30 MIN: CPT | Performed by: STUDENT IN AN ORGANIZED HEALTH CARE EDUCATION/TRAINING PROGRAM

## 2025-04-07 RX ORDER — MONTELUKAST SODIUM 10 MG/1
10 TABLET ORAL NIGHTLY
Qty: 30 TABLET | Refills: 3 | Status: SHIPPED | OUTPATIENT
Start: 2025-04-07

## 2025-04-07 RX ORDER — LISDEXAMFETAMINE DIMESYLATE 50 MG/1
50 CAPSULE ORAL EVERY MORNING
Qty: 30 CAPSULE | Refills: 0 | Status: SHIPPED | OUTPATIENT
Start: 2025-04-07 | End: 2025-05-07

## 2025-04-07 ASSESSMENT — PATIENT HEALTH QUESTIONNAIRE - PHQ9
1. LITTLE INTEREST OR PLEASURE IN DOING THINGS: NOT AT ALL
2. FEELING DOWN, DEPRESSED OR HOPELESS: NOT AT ALL
SUM OF ALL RESPONSES TO PHQ9 QUESTIONS 1 AND 2: 0

## 2025-04-07 NOTE — PROGRESS NOTES
"Subjective   Patient ID: Elsy Claire \"Luciano" is a 29 y.o. adult who presents for Med Management.  History of Present Illness  Elsy Claire \"Luciano" is a 29 year old with hypermobility spectrum disorder and suspected POTS who presents with severe menstrual cramps and gastrointestinal symptoms. They are accompanied by their partner.    Severe menstrual cramps began around 3 AM, accompanied by diarrhea and vomiting, which they attribute to the return of their menstrual cycle. They have previously sought gynecological care and expressed dissatisfaction with a prior gynecologist who prescribed birth control pills against their preference. They are currently under the care of a new gynecologist following abnormal Pap results and a colposcopy, with ongoing discussions about potential hysterectomy.    They have been diagnosed with hypermobility spectrum disorder by a  and are currently being evaluated for postural orthostatic tachycardia syndrome (POTS). Propranolol, taken twice daily for the past week and a half, has significantly reduced symptoms they previously attributed to anxiety, such as heatwaves and palpitations. They are scheduled for a tilt table test in three days to further investigate POTS.    They are currently taking Vyvanse 50 mg for ADHD, which they feel is effective. They also use albuterol as needed for asthma and recently completed a course of methylprednisolone for an asthma flare-up. Allergy symptoms are managed with Zyrtec, and they occasionally use a neti pot and steam inhaler for congestion relief.    Review of Systems  ROS otherwise negative aside from what was mentioned above in HPI.    Objective     LMP  (LMP Unknown)      Current Outpatient Medications   Medication Instructions    albuterol 90 mcg/actuation inhaler 2 puffs, inhalation, 3 times daily RT    hydrOXYzine pamoate (Vistaril) 25 mg capsule Take 1 capsule by mouth three times daily as needed for anxiety.    " levonorgestrel (Mirena) 20 mcg/24hr IUD 1 each    lisdexamfetamine (VYVANSE) 50 mg, oral, Every morning    montelukast (SINGULAIR) 10 mg, oral, Nightly    propranolol (INDERAL) 20 mg, oral, 2 times daily    sertraline (ZOLOFT) 200 mg, oral, Daily    traZODone (DESYREL) 50 mg, oral, Nightly       Physical Exam      Complete physical exam deferred in setting of telehealth appointment    Patient sitting comfortably  No acute distress  Behavior normal      Results  PATHOLOGY  Pap smear: abnormal    Assessment & Plan  Menstrual Cramps and Diarrhea  Acute onset of severe menstrual cramps with diarrhea and vomiting at 3 AM, likely due to menstruation resumption. Seeking hysterectomy as previous management strategies were ineffective.    Abnormal Pap Smear  Recent abnormal Pap smear led to colposcopy; results pending. Not overly concerned, views potential findings as justification for hysterectomy for insurance purposes.    Hypermobility Spectrum Disorder  Diagnosed with hypermobility spectrum disorder after  evaluation; does not meet criteria for hypermobile Tawny-Danlos syndrome (HEDS).    Suspected Postural Orthostatic Tachycardia Syndrome (POTS)  Referred to cardiologist for POTS evaluation. Propranolol twice daily has improved heat intolerance and palpitations. Tilt table test scheduled in three days. Anxiety reduction suggests previous anxiety may be POTS-related.  - Continue propranolol twice daily.  - Perform tilt table test in three days.    Generalized Anxiety Disorder  Significant anxiety improvement since starting propranolol, suggesting previous anxiety may be POTS-related. Considering sertraline dosage reduction post-POTS diagnosis confirmation.  - Consider reducing sertraline dosage after POTS diagnosis confirmation.    Asthma  Recent flare-up treated with methylprednisolone; symptoms improved with resolution of coughing and congestion. Uses albuterol as needed.    Attention Deficit Hyperactivity  Disorder (ADHD)  ADHD symptoms well-managed with Vyvanse 50 mg. Medication deemed effective.  UTD on CSA  UDS due next visit  - Refill Vyvanse prescription.    This visit was completed via telephone/virtual visit. All issues as documented below were discussed and addressed but no physical exam was performed. If it was felt that the patient should be evaluated via  face-to-face office appointment(s) they were directed there. The patient verbally consented to this visit.    I performed this visit using real-time telehealth tools, including an audio/video connection between Elsy Claire  and Zarina Grigsby DO Count includes the [facility].        Zarina Grigsby DO     This medical note was created with the assistance of artificial intelligence (AI) for documentation purposes. The content has been reviewed and confirmed by the healthcare provider for accuracy and completeness. Patient consented to the use of audio recording and use of AI during their visit.

## 2025-04-09 ENCOUNTER — PREP FOR PROCEDURE (OUTPATIENT)
Dept: OBSTETRICS AND GYNECOLOGY | Facility: CLINIC | Age: 30
End: 2025-04-09
Payer: COMMERCIAL

## 2025-04-09 DIAGNOSIS — D06.9 HIGH GRADE SQUAMOUS INTRAEPITHELIAL LESION (HGSIL), GRADE 3 CIN, ON BIOPSY OF CERVIX: Primary | ICD-10-CM

## 2025-04-09 LAB
LAB AP ASR DISCLAIMER: NORMAL
LABORATORY COMMENT REPORT: NORMAL
PATH REPORT.COMMENTS IMP SPEC: NORMAL
PATH REPORT.FINAL DX SPEC: NORMAL
PATH REPORT.GROSS SPEC: NORMAL
PATH REPORT.RELEVANT HX SPEC: NORMAL
PATH REPORT.TOTAL CANCER: NORMAL

## 2025-04-09 RX ORDER — ACETAMINOPHEN 325 MG/1
975 TABLET ORAL ONCE
OUTPATIENT
Start: 2025-04-09 | End: 2025-04-09

## 2025-04-09 RX ORDER — CELECOXIB 400 MG/1
400 CAPSULE ORAL ONCE
OUTPATIENT
Start: 2025-04-09 | End: 2025-04-09

## 2025-04-09 RX ORDER — GABAPENTIN 600 MG/1
600 TABLET ORAL ONCE
OUTPATIENT
Start: 2025-04-09 | End: 2025-04-09

## 2025-04-10 ENCOUNTER — HOSPITAL ENCOUNTER (OUTPATIENT)
Dept: CARDIOLOGY | Facility: HOSPITAL | Age: 30
Discharge: HOME | End: 2025-04-10
Payer: COMMERCIAL

## 2025-04-10 VITALS — HEART RATE: 83 BPM | SYSTOLIC BLOOD PRESSURE: 127 MMHG | OXYGEN SATURATION: 98 % | DIASTOLIC BLOOD PRESSURE: 80 MMHG

## 2025-04-10 DIAGNOSIS — R42 DIZZINESS ON STANDING: ICD-10-CM

## 2025-04-10 DIAGNOSIS — M35.7 HYPERMOBILITY SYNDROME: ICD-10-CM

## 2025-04-10 DIAGNOSIS — R00.2 PALPITATIONS: ICD-10-CM

## 2025-04-10 PROCEDURE — 93660 TILT TABLE EVALUATION: CPT

## 2025-04-10 NOTE — POST-PROCEDURE NOTE
Physician Transition of Care Summary  Invasive Cardiovascular Lab    Procedure Date: 4/10/2025  Attending: * No surgeons found in log *  Resident/Fellow/Other Assistant: * No surgeons found in log *    Indications:   Pre-syncope    Post-procedure diagnosis:   Normal Tilt table testing for Neurally Mediated Syncope.     Procedure(s):   Tilt-table testing    Procedure Findings:   Normal     Description of the Procedure:   Tilt Table Study     Summary:  Normal Tilt table testing for Neurally Mediated Syncope.   No significant cardioinhibitory or vasodepressive response noted during the test     Recommendations:  The patient should continue with the present medications.      Discharge:   Patient was discharged from procedure room after vital signs returned to baseline.      Follow up:   Follow up with PCP or cardiology office in six weeks.      Procedure narrative:  The risks, benefits, and alternatives to the procedure were explained to the patient, and informed consent was obtained. The patient was in the fasting state. The patient was set up for continuous telemetry monitoring of surface 12 lead ECG. Blood pressure was monitored with automatic cuff measurements.      See signed procedural log and parameters.    Complications:   The patient tolerated the procedure without any complications or incident.     Stents/Implants:   Implants       No implant documentation for this case.          Estimated Blood Loss:   * No values recorded between 4/10/2025  7:46 AM and 4/10/2025  9:07 AM *    Anesthesia: * No anesthesia type entered * Anesthesia Staff: No anesthesia staff entered.    Any Specimen(s) Removed:   No specimens collected during this procedure.    Disposition:   Home    Electronically signed by: Gilbert Martini MD, 4/10/2025 3:03 PM

## 2025-04-11 ENCOUNTER — PATIENT MESSAGE (OUTPATIENT)
Dept: OBSTETRICS AND GYNECOLOGY | Facility: CLINIC | Age: 30
End: 2025-04-11
Payer: COMMERCIAL

## 2025-04-17 ENCOUNTER — TELEPHONE (OUTPATIENT)
Dept: OBSTETRICS AND GYNECOLOGY | Facility: CLINIC | Age: 30
End: 2025-04-17
Payer: COMMERCIAL

## 2025-04-17 ENCOUNTER — HOSPITAL ENCOUNTER (OUTPATIENT)
Facility: CLINIC | Age: 30
Setting detail: OUTPATIENT SURGERY
End: 2025-04-17
Attending: STUDENT IN AN ORGANIZED HEALTH CARE EDUCATION/TRAINING PROGRAM | Admitting: STUDENT IN AN ORGANIZED HEALTH CARE EDUCATION/TRAINING PROGRAM
Payer: COMMERCIAL

## 2025-04-17 DIAGNOSIS — D06.9 HIGH GRADE SQUAMOUS INTRAEPITHELIAL LESION (HGSIL), GRADE 3 CIN, ON BIOPSY OF CERVIX: Primary | ICD-10-CM

## 2025-04-17 DIAGNOSIS — Z98.890 S/P LEEP (LOOP ELECTROSURGICAL EXCISION PROCEDURE): ICD-10-CM

## 2025-04-18 ENCOUNTER — APPOINTMENT (OUTPATIENT)
Dept: CARDIOLOGY | Facility: CLINIC | Age: 30
End: 2025-04-18
Payer: COMMERCIAL

## 2025-04-18 ENCOUNTER — PATIENT MESSAGE (OUTPATIENT)
Dept: PRIMARY CARE | Facility: CLINIC | Age: 30
End: 2025-04-18

## 2025-04-18 DIAGNOSIS — R00.2 PALPITATIONS: ICD-10-CM

## 2025-04-18 DIAGNOSIS — F41.0 PANIC ATTACKS: ICD-10-CM

## 2025-04-18 DIAGNOSIS — F41.8 ANXIETY ABOUT HEALTH: Primary | ICD-10-CM

## 2025-04-18 DIAGNOSIS — R00.0 RACING HEART BEAT: Primary | ICD-10-CM

## 2025-04-18 PROCEDURE — 99213 OFFICE O/P EST LOW 20 MIN: CPT

## 2025-04-18 NOTE — PROGRESS NOTES
Virtual or Telephone Consent    An interactive audio and video telecommunication system which permits real time communications between the patient (at the originating site) and provider (at the distant site) was utilized to provide this telehealth service.   Verbal consent was requested and obtained from Elsy Claire on this date, 04/18/25 for a telehealth visit and the patient's location was confirmed at the time of the visit.    Chief Complaint:   FUV     History Of Present Illness:    Ania Claire is a 29 y.o. adult presenting with PMH of Asthma, ADHD, Anxiety who was recently evaluated for palpitations, elevated HR, heat intolerance, dizziness with positional changes (bending over) that has been going on for at least 10 years. Tilt table test was negative for POTS.     Today we are virtual and is accompanied by her wife. Ania was started on propanolol 3/25/25and has been wearing compression stockings . She reports that since starting propanolol and using compression stockings her symptoms have significantly improved and have subsided.      Last Recorded Vitals:  There were no vitals filed for this visit.    Past Medical History:  Ania has a past medical history of ADHD (attention deficit hyperactivity disorder) (Chilhood but treated starting this jan), Allergic (seasonal and bugs), Anxiety (Childhood), Asthma (Around age 18), Depression (Childhood), GERD (gastroesophageal reflux disease) (Mild), Hypermobility syndrome, and Visual impairment (I wear glasses since I was a kid).    Past Surgical History:  Ania has a past surgical history that includes [place] tooth extraction (march of 22).      Social History:  Ania reports that Ania has never smoked. Ania has never used smokeless tobacco. Ania reports that Ania does not currently use alcohol. Ania reports current drug use. Frequency: 7.00 times per week. Drug: Marijuana.    Family History:  Family History[1]     Allergies:  Griseofulvin, Bee  "venom protein (honey bee), and Adhesive tape-silicones    Outpatient Medications:  Current Outpatient Medications   Medication Instructions    albuterol 90 mcg/actuation inhaler 2 puffs, inhalation, 3 times daily RT    hydrOXYzine pamoate (Vistaril) 25 mg capsule Take 1 capsule by mouth three times daily as needed for anxiety.    levonorgestrel (Mirena) 20 mcg/24hr IUD 1 each    lisdexamfetamine (VYVANSE) 50 mg, oral, Every morning    montelukast (SINGULAIR) 10 mg, oral, Nightly    propranolol (INDERAL) 20 mg, oral, 2 times daily    sertraline (ZOLOFT) 200 mg, oral, Daily    traZODone (DESYREL) 50 mg, oral, Nightly     Physical examination performed via telemedicine encounter:  General: awake, alert, non-diaphoretic, no psychomotor agitation, no acute distress.  Head: atraumatic, normocephalic, no rashes or lesions noted.  Eyes: no redness, discharge, swelling, or lesions.  Nose: no redness, swelling, discharge, deformity, or impetigo/crusting.  Skin: no lesions, wounds, erythema, or cyanosis noted on face or hands.  Cardiopulmonary: no increased respiratory effort, speaking in clear sentences, inspiratory to expiratory ratio within normal limits.  Musculoskeletal: normal range of motion of neck, upper extremities, and hands with no obvious synovitis.  Neurologic: cranial nerves grossly normal, speech normal rate and rhythm, moved both upper extremities equally.  Psychiatric: normal appearance, normal behavior, and normal attitude; well groomed, pleasant, cooperative.        Last Labs:  CBC -  Lab Results   Component Value Date    WBC 7.9 01/21/2025    HGB 13.6 01/21/2025    HCT 41.4 01/21/2025    MCV 88 01/21/2025     01/21/2025       CMP -  Lab Results   Component Value Date    CALCIUM 9.3 01/21/2025    PROT 7.5 01/21/2025    ALBUMIN 4.6 01/21/2025    AST 18 01/21/2025    ALT 28 01/21/2025    ALKPHOS 42 01/21/2025    BILITOT 0.4 01/21/2025       LIPID PANEL -   No results found for: \"CHOL\", \"TRIG\", \"HDL\", " "\"CHHDL\", \"LDLF\", \"VLDL\", \"NHDL\"    RENAL FUNCTION PANEL -   Lab Results   Component Value Date    GLUCOSE 103 (H) 01/21/2025     01/21/2025    K 4.0 01/21/2025     01/21/2025    CO2 25 01/21/2025    ANIONGAP 13 01/21/2025    BUN 11 01/21/2025    CREATININE 0.83 01/21/2025    CALCIUM 9.3 01/21/2025    ALBUMIN 4.6 01/21/2025        Lab Results   Component Value Date    HGBA1C 5.1 01/21/2025       Last Cardiology Tests:  ECG:  ECG 12 lead (Clinic Performed) 03/25/2025    Echo:  Transthoracic Echo (TTE) Complete 02/18/2025    Ejection Fractions:  EF   Date/Time Value Ref Range Status   02/18/2025 09:15 AM 63 %       CONCLUSIONS:   1. The left ventricular systolic function is normal, with a Emmanuel's biplane calculated ejection fraction of 63%.   2. Right ventricular systolic pressure is within normal limits.    Assessment/Plan   Ania Claire is a 29 y.o. adult presenting with PMH of Asthma, ADHD, Anxiety who was recently evaluated for palpitations, elevated HR, heat intolerance, dizziness with positional changes (bending over) that has been going on for at least 10 years. Tilt table test was negative for POTS.     Today we are virtual and is accompanied by her wife. Ania was started on propanolol 3/25/25and has been wearing compression stockings . She reports that since starting propanolol and using compression stockings her symptoms have significantly improved and have subsided. TTE 2/2025 showed normal LV function and no valvular disorders.     However, given unclear etiology as to why she is significantly tachycardiac and symptoms of positions dizziness will have her wear a Holter monitor . We discussed stopping propanolol and that her symptoms most likely would return for a period of time. She is agreeable.     Plan  - Please call to schedule Holter Monitor 2 weeks  - Follow up virtually in 6 weeks    DAINA Byrne-JUSTO       [1]   Family History  Problem Relation Name Age of Onset    " Arthritis Mother Arelis Antunez     Cancer Mother Arelis Antunez     Depression Mother Arelis Antunez     Hypertension Mother Arelis Antunez     Mental illness Mother Arelis Antunez     Hypertension Father Ryan Dakotah     Mental illness Father Ryan [place]     Arthritis Maternal Grandmother Arelis Chaogomery     Depression Maternal Grandmother Arelis Chaogomery     Mental illness Maternal Grandmother Arelis Chaogomery     Cancer Paternal Grandmother Adele Claire     Depression Brother Patrick Claire     Mental illness Brother Patrick Claire

## 2025-04-21 RX ORDER — CLONAZEPAM 0.5 MG/1
0.5 TABLET ORAL 2 TIMES DAILY PRN
Qty: 30 TABLET | Refills: 0 | Status: SHIPPED | OUTPATIENT
Start: 2025-04-21 | End: 2025-10-18

## 2025-04-22 DIAGNOSIS — F51.01 PRIMARY INSOMNIA: ICD-10-CM

## 2025-04-23 RX ORDER — TRAZODONE HYDROCHLORIDE 50 MG/1
50 TABLET ORAL NIGHTLY
Qty: 90 TABLET | Refills: 0 | Status: SHIPPED | OUTPATIENT
Start: 2025-04-23

## 2025-05-02 DIAGNOSIS — F32.A DEPRESSION, UNSPECIFIED DEPRESSION TYPE: Primary | ICD-10-CM

## 2025-05-02 RX ORDER — BUPROPION HYDROCHLORIDE 150 MG/1
150 TABLET ORAL EVERY MORNING
Qty: 30 TABLET | Refills: 1 | Status: SHIPPED | OUTPATIENT
Start: 2025-05-02 | End: 2025-07-01

## 2025-05-12 ENCOUNTER — PATIENT MESSAGE (OUTPATIENT)
Dept: OBSTETRICS AND GYNECOLOGY | Facility: CLINIC | Age: 30
End: 2025-05-12
Payer: COMMERCIAL

## 2025-05-12 RX ORDER — LORAZEPAM 1 MG/1
TABLET ORAL
Qty: 1 TABLET | Refills: 0 | Status: CANCELLED | OUTPATIENT
Start: 2025-05-12

## 2025-05-12 ASSESSMENT — ENCOUNTER SYMPTOMS
PALPITATIONS: 1
DIZZINESS: 1

## 2025-05-12 NOTE — CPM/PAT H&P
CPM/PAT Evaluation       Name: Elsy Claire   /Age: 1995       TELEMEDICINE ENCOUNTER  Patient was interviewed by telephone for preadmission testing perioperative risk assessment prior to surgery.    DATE OF CONSULT: 2025  REFERRING PROVIDER: Dr. Guillermo Wilson  SURGERY, DATE, AND LENGTH: Colposcopy with LEEP; 2025; 50 minutes    CHIEF COMPLAINT  High-grade squamous intraepithelial lesion, grade 3 IVA    HPI  Elsy Claire is a 29-year-old female with history of abnormal Pap with ASCUS HPV positive.  Patient underwent colposcopy with pathology showing HGSIL, grade 3 IVA.  Patient has been referred to preadmission testing in anticipation of LEEP procedure.  Patient with medical history significant for asthma; ADHD; anxiety; palpitations; dizziness with positional changes (tilt table test negative for POTS), improved symptoms since starting twice daily propranolol and wearing compression stockings; hypermobility syndrome (normal TTE from 2025-normal LV function and no valvular disorders).  Patient has no other medical complaints at this time, and reports to be in usual state of health without any current or recent illnesses/infections/fevers, or hospitalizations. In the past month, neither the patient nor anyone in the household has had any respiratory illnesses including Covid 19, Influenza; Respiratory Syncytial Virus (RSV), or pneumonia.    ACTIVE PROBLEMS  Problem List[1]     PAST MEDICAL HISTORY  Medical History[2]     SURGICAL HISTORY  Surgical History[3]     ANESTHESIA HISTORY  Denies problems with anesthesia in the past such as PONV, prolonged sedation, awareness, dental damage, aspiration, cardiac arrest, difficult intubation, or unexpected hospital admissions. Denies family history of malignant hyperthermia, or pseudocholinesterase deficiency.     SOCIAL HISTORY  Never smoker; no alcohol use; daily medical marijuana; no recreational drug use.  Patient states  she is able to do moderate ADLs such as housework, yard work.  She denies shortness of breath or chest pain when walking up a flight of stairs or when walking at a moderate pace.  METS >6    FAMILY HISTORY  Family History[4]     ALLERGIES  RX Allergies[5]     MEDICATIONS  Current Medications[6]     REVIEW OF SYSTEMS  Review of Systems   Cardiovascular:  Positive for palpitations.   Genitourinary:         HGSIL of cervix   Neurological:  Positive for dizziness.   Psychiatric/Behavioral:          Anxiety   All other systems reviewed and are negative.    STOP BANG:  Negative for SHAREE    PHYSICAL EXAM  Deferred    AIRWAY EXAM  Deferred    VITALS  No vitals taken for telemedicine visit  BMI Readings from Last 1 Encounters:   03/26/25 31.64 kg/m²      BP Readings from Last 4 Encounters:   04/10/25 127/80   03/26/25 123/76   03/25/25 138/74   03/14/25 (!) 137/92        LABS  Lab Results   Component Value Date    WBC 7.9 01/21/2025    HGB 13.6 01/21/2025    HCT 41.4 01/21/2025    MCV 88 01/21/2025     01/21/2025      Lab Results   Component Value Date    GLUCOSE 103 (H) 01/21/2025    CALCIUM 9.3 01/21/2025     01/21/2025    K 4.0 01/21/2025    CO2 25 01/21/2025     01/21/2025    BUN 11 01/21/2025    CREATININE 0.83 01/21/2025      Lab Results   Component Value Date    HGBA1C 5.1 01/21/2025        Encounter Date: 03/25/25   ECG 12 lead (Clinic Performed)   Result Value    Ventricular Rate 124    Atrial Rate 124    DE Interval 138    QRS Duration 76    QT Interval 322    QTC Calculation(Bazett) 462    P Axis 48    R Axis 55    T Axis -21    QRS Count 21    Q Onset 220    P Onset 151    P Offset 205    T Offset 381    QTC Fredericia 410    Narrative    Sinus tachycardia  Nonspecific ST and T wave abnormality  Abnormal ECG  No previous ECGs available  Confirmed by Abdias Worley ([id number]) on 3/26/2025 9:34:52 AM        Transthoracic Echo (TTE) Complete 02/18/2025  Indication:    Generalized hypermobility of  joints M24.80  CPT Code:      Echo Complete w Full Doppler-[id number]   Study Detail: The following Echo studies were performed: 2D, M-Mode, Doppler and                color flow.        PHYSICIAN INTERPRETATION:  Left Ventricle: The left ventricular systolic function is normal, with a Emmanuel's biplane calculated ejection fraction of 63%. There are no regional left ventricular wall motion abnormalities. The left ventricular cavity size is normal. There is normal septal and normal posterior left ventricular wall thickness. Spectral Doppler shows a normal pattern of left ventricular diastolic filling.  Left Atrium: The left atrial size is normal.  Right Ventricle: The right ventricle is normal in size. There is normal right ventricular global systolic function.  Right Atrium: The right atrium is normal in size.  Aortic Valve: The aortic valve is trileaflet. The aortic valve dimensionless index is 0.64. There is no evidence of aortic valve regurgitation. The peak instantaneous gradient of the aortic valve is 9 mmHg. The mean gradient of the aortic valve is 4 mmHg.  Mitral Valve: The mitral valve is normal in structure. There is trace mitral valve regurgitation.  Tricuspid Valve: The tricuspid valve is structurally normal. There is trace tricuspid regurgitation. The Doppler estimated RVSP is within normal limits at 19.3 mmHg.  Pulmonic Valve: The pulmonic valve is structurally normal. There is mild pulmonic valve regurgitation.  Pericardium: There is no pericardial effusion noted.  Aorta: The aortic root is normal.  Systemic Veins: The inferior vena cava appears normal in size.        CONCLUSIONS:   1. The left ventricular systolic function is normal, with a Emmanuel's biplane calculated ejection fraction of 63%.   2. Right ventricular systolic pressure is within normal limits.          ASSESSMENT/PLAN  HGSIL of cervix  Colposcopy with LEEP      Preoperative instructions reviewed in detail with patient during this encounter.  A copy of these instructions has been unloaded to [facility] along with a copy sent to either home email address or mailed to home address.  This note was created in part upon personal review of patient's medical records.  Speech recognition transcription software was used in the creation of this note. Despite proofreading, several typographical errors might be present that might affect the meaning of the content.            [1]   Patient Active Problem List  Diagnosis    Breakthrough bleeding with IUD    Wheezing    Polyarthralgia    Myalgia    Generalized hypermobility of joints    Dysmenorrhea    Hypermobility syndrome    Dizziness on standing    Anxiety and depression    Palpitations    High grade squamous intraepithelial lesion (HGSIL), grade 3 IVA, on biopsy of cervix    Racing heart beat   [2]   Past Medical History:  Diagnosis Date    ADHD (attention deficit hyperactivity disorder) Chilhood but treated starting this lakshmi    Allergic seasonal and bugs    Anxiety Childhood    Asthma Around age 18    Depression Childhood    GERD (gastroesophageal reflux disease) Mild    Hypermobility syndrome     Visual impairment I wear glasses since I was a kid   [3]   Past Surgical History:  Procedure Laterality Date    WISDOM TOOTH EXTRACTION  march of 22   [4]   Family History  Problem Relation Name Age of Onset    Arthritis Mother Arelis Antunez     Cancer Mother Arelis Antunez     Depression Mother Arelis Antunez     Hypertension Mother Arelis Antunez     Mental illness Mother Arelis Antunez     Hypertension Father Ryan Dakotah     Mental illness Father Ryan Dakotah     Arthritis Maternal Grandmother Arelis Ordonezmery     Depression Maternal Grandmother Arelis Ordonezmery     Mental illness Maternal Grandmother Arelis Antunez     Cancer Paternal Grandmother Adele Dakotah     Depression Brother Patrick Dakotah     Mental illness Brother Patrick Dakotah    [5]   Allergies  Allergen Reactions    Griseofulvin Hives,  Itching and Rash    Bee Venom Protein (Honey Bee) Unknown    Adhesive Tape-Silicones Swelling     Paper tape only   [6] No current facility-administered medications for this encounter.    Current Outpatient Medications:     albuterol 90 mcg/actuation inhaler, Inhale 2 puffs 3 times a day., Disp: 18 g, Rfl: 1    buPROPion XL (Wellbutrin XL) 150 mg 24 hr tablet, Take 1 tablet (150 mg) by mouth once daily in the morning. Do not crush, chew, or split., Disp: 30 tablet, Rfl: 1    clonazePAM (KlonoPIN) 0.5 mg tablet, Take 1 tablet (0.5 mg) by mouth 2 times a day as needed for anxiety., Disp: 30 tablet, Rfl: 0    hydrOXYzine pamoate (Vistaril) 25 mg capsule, Take 1 capsule by mouth three times daily as needed for anxiety., Disp: 90 capsule, Rfl: 0    lisdexamfetamine (Vyvanse) 50 mg capsule, Take 1 capsule (50 mg) by mouth once daily in the morning., Disp: 30 capsule, Rfl: 0    montelukast (Singulair) 10 mg tablet, Take 1 tablet by mouth once daily at bedtime., Disp: 30 tablet, Rfl: 3    propranolol (Inderal) 20 mg tablet, Take 1 tablet (20 mg) by mouth 2 times a day., Disp: 60 tablet, Rfl: 1    sertraline (Zoloft) 100 mg tablet, Take 2 tablets (200 mg) by mouth once daily., Disp: 90 tablet, Rfl: 3    traZODone (Desyrel) 50 mg tablet, Take 1 tablet by mouth once daily at bedtime., Disp: 90 tablet, Rfl: 0    levonorgestrel (Mirena) 20 mcg/24hr IUD, 52 mg by intrauterine route., Disp: , Rfl:

## 2025-05-12 NOTE — PREPROCEDURE INSTRUCTIONS
Pre-Operative Instructions &?Checklist      Your surgery has been scheduled at [facility] at [address] [place], [address], [address], in the [facility]. Parking is to the left of the main entrance.     You will be contacted about the time of your surgery the day before your surgery (if your surgery is on a Monday, you will be called the Friday before surgery). If you are unable to answer the phone, a detailed voicemail message will be left. Make sure that your voicemail box is not full so a message can be left. If you have not received a call by 3:00 pm you may call [phone] between the hours of 3:00 and 4:00 pm. Please be available by phone the night before/day of surgery in case there is a change in the schedule which may require you to arrive earlier/later.     ?     ?7 DAYS BEFORE SURGERY STOP THESE MEDICATIONS:       *Multiple Vitamins containing Vitamin E       * Herbal supplements, Fish Oil, garlic pills, turmeric, CoQ enzyme       *Stop taking aspirin, aspirin-containing products, and NSAID's like Advil, Motrin, Aleve, Ibuprofen, Meloxicam, Celecoxib, and Diclofenac.. Tylenol is okay to take for pain relief.        *If you are currently taking Coumadin/Warfarin, we will have to coordinate that with your PCP &/or the Anticoagulation Clinic.     THE EVENING BEFORE SURGERY:   Do not eat any food after midnight the night before surgery.    You are permitted to have no more than 4 ounces of clear liquid up to 3 hours before your arrival time.   Clear liquids are liquids you see through such as: water, pulp-free juices like apple or white grape juice, coffee and tea without creamer or milk (sugar is okay); clear soda and sports drinks.     DAY OF SURGERY TAKE THESE MEDICATIONS (if it is not listed, do not take it.)    Take: Propranolol; if having any asthma symptoms use your albuterol inhaler.  Bring your albuterol inhaler to the surgery center.  If taking medications in  tablet/capsule form take with a small sip of water.     ON THE MORNING OF SURGERY:       *Shower either the night before your surgery or the morning of your surgery       *Do not use moisturizers, creams, lotions or perfume, or make-up.       *Wear comfortable, loose fitting clothing.        *All jewelry and valuables should be left at home.       *Prosthetic devices such as contact lenses, hearing aids, dentures, eyelash extensions, hairpins and body piercings must be removed before surgery. Bring         containers for eyeglasses/contacts, dentures, or hearing aids with you.     ? Diabetics: Please check fasting blood sugars upon waking up. ?If fasting blood sugars are <80ml/dl, please drink 100ml/3oz. of apple juice no later than 2 hours prior                    to surgery.     ?BRING WITH YOU:        *Photo ID and insurance card       *Current list of medicines and allergies       *Pacemaker/Defibrillator/Heart stent cards       *Copy of your complete Advanced Directive/DHPOA, or proof of guardianship-if applicable     ?SMOKING:       *Quitting smoking can make a huge difference to your health and recovery from surgery. ?       *If you need help with quitting, call 6-779SoftRunQUIT-NOW.       ALCOHOL:       *No alcoholic beverages for 48 hours before surgery.     ?AFTER OUTPATIENT SURGERY:       *A responsible adult MUST accompany you at the time of discharge and stay with you for 24 hours after your surgery.       *You may NOT drive yourself home after surgery.       *You may use a taxi or ride sharing service (eTruck, Uber) to return home ONLY if you are accompanied by a friend or family member       *Instructions for resuming your medications will be provided by your surgeon.     CONTACT SURGEON'S OFFICE IF YOU DEVELOP:       *Fever =/>?100.4 F        *New respiratory symptoms (e.g. cough, shortness of breath, respiratory distress, sore throat)       *Recent loss of taste or smell       *Flu like symptoms such as  headache, fatigue or gastrointestinal symptoms       *If you develop any open sores, shingles, burning or painful urination    AND/OR:       *You no longer wish to have the surgery.       *Any other personal circumstances change that may lead to the need to cancel or defer this surgery.       *You were admitted to any hospital within one week of your planned procedure.     ?If you have any questions regarding these preoperative instructions, you may call [phone]. If you have questions regarding you surgical procedure, or post-operative care/recovery please call your surgeon's office.     Link to [facility] Laboratory Services Locations   [email or web address]     Link to [facility] [employer]   [email or web address]\

## 2025-05-14 ENCOUNTER — PATIENT MESSAGE (OUTPATIENT)
Dept: PRIMARY CARE | Facility: CLINIC | Age: 30
End: 2025-05-14
Payer: COMMERCIAL

## 2025-05-14 DIAGNOSIS — R00.2 PALPITATIONS: ICD-10-CM

## 2025-05-14 DIAGNOSIS — F41.9 ANXIETY AND DEPRESSION: ICD-10-CM

## 2025-05-14 DIAGNOSIS — F90.0 ATTENTION DEFICIT HYPERACTIVITY DISORDER (ADHD), PREDOMINANTLY INATTENTIVE TYPE: ICD-10-CM

## 2025-05-14 DIAGNOSIS — F32.A ANXIETY AND DEPRESSION: ICD-10-CM

## 2025-05-14 RX ORDER — LISDEXAMFETAMINE DIMESYLATE 50 MG/1
50 CAPSULE ORAL EVERY MORNING
Qty: 30 CAPSULE | Refills: 0 | Status: SHIPPED | OUTPATIENT
Start: 2025-05-14 | End: 2025-06-13

## 2025-05-14 RX ORDER — HYDROXYZINE PAMOATE 25 MG/1
CAPSULE ORAL
Qty: 90 CAPSULE | Refills: 3 | Status: SHIPPED | OUTPATIENT
Start: 2025-05-14

## 2025-05-16 RX ORDER — PROPRANOLOL HYDROCHLORIDE 20 MG/1
20 TABLET ORAL 2 TIMES DAILY
Qty: 60 TABLET | Refills: 0 | Status: SHIPPED | OUTPATIENT
Start: 2025-05-16

## 2025-05-19 ENCOUNTER — ANESTHESIA EVENT (OUTPATIENT)
Dept: OPERATING ROOM | Facility: CLINIC | Age: 30
End: 2025-05-19
Payer: COMMERCIAL

## 2025-05-21 ENCOUNTER — ANESTHESIA (OUTPATIENT)
Dept: OPERATING ROOM | Facility: CLINIC | Age: 30
End: 2025-05-21
Payer: COMMERCIAL

## 2025-05-21 VITALS
SYSTOLIC BLOOD PRESSURE: 97 MMHG | WEIGHT: 198.63 LBS | HEART RATE: 74 BPM | OXYGEN SATURATION: 96 % | DIASTOLIC BLOOD PRESSURE: 55 MMHG | BODY MASS INDEX: 31.18 KG/M2 | RESPIRATION RATE: 16 BRPM | HEIGHT: 67 IN | TEMPERATURE: 97 F

## 2025-05-21 PROBLEM — Z98.890 S/P LEEP (LOOP ELECTROSURGICAL EXCISION PROCEDURE): Status: ACTIVE | Noted: 2025-05-21

## 2025-05-21 PROBLEM — J45.909 MILD ASTHMA (HHS-HCC): Status: ACTIVE | Noted: 2025-05-21

## 2025-05-21 LAB — PREGNANCY TEST URINE, POC: NEGATIVE

## 2025-05-21 PROCEDURE — 7100000009 HC PHASE TWO TIME - INITIAL BASE CHARGE: Performed by: STUDENT IN AN ORGANIZED HEALTH CARE EDUCATION/TRAINING PROGRAM

## 2025-05-21 PROCEDURE — 88305 TISSUE EXAM BY PATHOLOGIST: CPT | Performed by: PATHOLOGY

## 2025-05-21 PROCEDURE — 3700000001 HC GENERAL ANESTHESIA TIME - INITIAL BASE CHARGE: Performed by: STUDENT IN AN ORGANIZED HEALTH CARE EDUCATION/TRAINING PROGRAM

## 2025-05-21 PROCEDURE — 2720000007 HC OR 272 NO HCPCS: Performed by: STUDENT IN AN ORGANIZED HEALTH CARE EDUCATION/TRAINING PROGRAM

## 2025-05-21 PROCEDURE — 3700000002 HC GENERAL ANESTHESIA TIME - EACH INCREMENTAL 1 MINUTE: Performed by: STUDENT IN AN ORGANIZED HEALTH CARE EDUCATION/TRAINING PROGRAM

## 2025-05-21 PROCEDURE — 7100000001 HC RECOVERY ROOM TIME - INITIAL BASE CHARGE: Performed by: STUDENT IN AN ORGANIZED HEALTH CARE EDUCATION/TRAINING PROGRAM

## 2025-05-21 PROCEDURE — 88307 TISSUE EXAM BY PATHOLOGIST: CPT | Performed by: PATHOLOGY

## 2025-05-21 PROCEDURE — 2500000005 HC RX 250 GENERAL PHARMACY W/O HCPCS: Performed by: STUDENT IN AN ORGANIZED HEALTH CARE EDUCATION/TRAINING PROGRAM

## 2025-05-21 PROCEDURE — 88305 TISSUE EXAM BY PATHOLOGIST: CPT | Mod: TC,SUR | Performed by: STUDENT IN AN ORGANIZED HEALTH CARE EDUCATION/TRAINING PROGRAM

## 2025-05-21 PROCEDURE — 57460 BX OF CERVIX W/SCOPE LEEP: CPT | Performed by: STUDENT IN AN ORGANIZED HEALTH CARE EDUCATION/TRAINING PROGRAM

## 2025-05-21 PROCEDURE — A57460 PR COLPOSCOPY,CERVIX W/ADJ VAG,W/LOOP BX: Performed by: STUDENT IN AN ORGANIZED HEALTH CARE EDUCATION/TRAINING PROGRAM

## 2025-05-21 PROCEDURE — 7100000010 HC PHASE TWO TIME - EACH INCREMENTAL 1 MINUTE: Performed by: STUDENT IN AN ORGANIZED HEALTH CARE EDUCATION/TRAINING PROGRAM

## 2025-05-21 PROCEDURE — 2500000001 HC RX 250 WO HCPCS SELF ADMINISTERED DRUGS (ALT 637 FOR MEDICARE OP): Performed by: STUDENT IN AN ORGANIZED HEALTH CARE EDUCATION/TRAINING PROGRAM

## 2025-05-21 PROCEDURE — 2500000004 HC RX 250 GENERAL PHARMACY W/ HCPCS (ALT 636 FOR OP/ED): Performed by: STUDENT IN AN ORGANIZED HEALTH CARE EDUCATION/TRAINING PROGRAM

## 2025-05-21 PROCEDURE — 2500000004 HC RX 250 GENERAL PHARMACY W/ HCPCS (ALT 636 FOR OP/ED): Mod: JZ

## 2025-05-21 PROCEDURE — 3600000003 HC OR TIME - INITIAL BASE CHARGE - PROCEDURE LEVEL THREE: Performed by: STUDENT IN AN ORGANIZED HEALTH CARE EDUCATION/TRAINING PROGRAM

## 2025-05-21 PROCEDURE — 7100000002 HC RECOVERY ROOM TIME - EACH INCREMENTAL 1 MINUTE: Performed by: STUDENT IN AN ORGANIZED HEALTH CARE EDUCATION/TRAINING PROGRAM

## 2025-05-21 PROCEDURE — 2500000004 HC RX 250 GENERAL PHARMACY W/ HCPCS (ALT 636 FOR OP/ED): Mod: JZ | Performed by: ANESTHESIOLOGY

## 2025-05-21 PROCEDURE — A57460 PR COLPOSCOPY,CERVIX W/ADJ VAG,W/LOOP BX: Performed by: REGISTERED NURSE

## 2025-05-21 PROCEDURE — 3600000008 HC OR TIME - EACH INCREMENTAL 1 MINUTE - PROCEDURE LEVEL THREE: Performed by: STUDENT IN AN ORGANIZED HEALTH CARE EDUCATION/TRAINING PROGRAM

## 2025-05-21 RX ORDER — ONDANSETRON 4 MG/1
4 TABLET, FILM COATED ORAL EVERY 6 HOURS PRN
Qty: 20 TABLET | Refills: 0 | Status: SHIPPED | OUTPATIENT
Start: 2025-05-21 | End: 2025-05-28 | Stop reason: SDUPTHER

## 2025-05-21 RX ORDER — PROPOFOL 10 MG/ML
INJECTION, EMULSION INTRAVENOUS AS NEEDED
Status: DISCONTINUED | OUTPATIENT
Start: 2025-05-21 | End: 2025-05-21

## 2025-05-21 RX ORDER — CELECOXIB 200 MG/1
400 CAPSULE ORAL ONCE
Status: DISCONTINUED | OUTPATIENT
Start: 2025-05-21 | End: 2025-05-21

## 2025-05-21 RX ORDER — FENTANYL CITRATE 50 UG/ML
INJECTION, SOLUTION INTRAMUSCULAR; INTRAVENOUS AS NEEDED
Status: DISCONTINUED | OUTPATIENT
Start: 2025-05-21 | End: 2025-05-21

## 2025-05-21 RX ORDER — MIDAZOLAM HYDROCHLORIDE 1 MG/ML
INJECTION, SOLUTION INTRAMUSCULAR; INTRAVENOUS AS NEEDED
Status: DISCONTINUED | OUTPATIENT
Start: 2025-05-21 | End: 2025-05-21

## 2025-05-21 RX ORDER — OXYCODONE HYDROCHLORIDE 5 MG/1
5 TABLET ORAL EVERY 4 HOURS PRN
Status: DISCONTINUED | OUTPATIENT
Start: 2025-05-21 | End: 2025-05-21 | Stop reason: HOSPADM

## 2025-05-21 RX ORDER — LABETALOL HYDROCHLORIDE 5 MG/ML
5 INJECTION, SOLUTION INTRAVENOUS ONCE AS NEEDED
Status: DISCONTINUED | OUTPATIENT
Start: 2025-05-21 | End: 2025-05-21 | Stop reason: HOSPADM

## 2025-05-21 RX ORDER — LIDOCAINE HCL/PF 100 MG/5ML
SYRINGE (ML) INTRAVENOUS AS NEEDED
Status: DISCONTINUED | OUTPATIENT
Start: 2025-05-21 | End: 2025-05-21

## 2025-05-21 RX ORDER — ACETAMINOPHEN 325 MG/1
650 TABLET ORAL EVERY 4 HOURS PRN
Status: DISCONTINUED | OUTPATIENT
Start: 2025-05-21 | End: 2025-05-21 | Stop reason: HOSPADM

## 2025-05-21 RX ORDER — ACETAMINOPHEN 325 MG/1
1000 TABLET ORAL EVERY 6 HOURS PRN
Qty: 168 TABLET | Refills: 0 | Status: SHIPPED | OUTPATIENT
Start: 2025-05-21 | End: 2025-06-04

## 2025-05-21 RX ORDER — FENTANYL CITRATE 50 UG/ML
50 INJECTION, SOLUTION INTRAMUSCULAR; INTRAVENOUS EVERY 5 MIN PRN
Status: DISCONTINUED | OUTPATIENT
Start: 2025-05-21 | End: 2025-05-21 | Stop reason: HOSPADM

## 2025-05-21 RX ORDER — SODIUM CHLORIDE, SODIUM LACTATE, POTASSIUM CHLORIDE, CALCIUM CHLORIDE 600; 310; 30; 20 MG/100ML; MG/100ML; MG/100ML; MG/100ML
50 INJECTION, SOLUTION INTRAVENOUS CONTINUOUS
Status: SHIPPED | OUTPATIENT
Start: 2025-05-21 | End: 2025-05-21

## 2025-05-21 RX ORDER — FENTANYL CITRATE 50 UG/ML
25 INJECTION, SOLUTION INTRAMUSCULAR; INTRAVENOUS EVERY 5 MIN PRN
Status: DISCONTINUED | OUTPATIENT
Start: 2025-05-21 | End: 2025-05-21 | Stop reason: HOSPADM

## 2025-05-21 RX ORDER — ACETIC ACID 5 %
LIQUID (ML) MISCELLANEOUS CONTINUOUS PRN
Status: COMPLETED | OUTPATIENT
Start: 2025-05-21 | End: 2025-05-21

## 2025-05-21 RX ORDER — PHENYLEPHRINE HCL IN 0.9% NACL 0.4MG/10ML
SYRINGE (ML) INTRAVENOUS AS NEEDED
Status: DISCONTINUED | OUTPATIENT
Start: 2025-05-21 | End: 2025-05-21

## 2025-05-21 RX ORDER — LIDOCAINE HYDROCHLORIDE 10 MG/ML
0.1 INJECTION, SOLUTION EPIDURAL; INFILTRATION; INTRACAUDAL; PERINEURAL ONCE
Status: DISCONTINUED | OUTPATIENT
Start: 2025-05-21 | End: 2025-05-21 | Stop reason: HOSPADM

## 2025-05-21 RX ORDER — ACETAMINOPHEN 325 MG/1
975 TABLET ORAL ONCE
Status: COMPLETED | OUTPATIENT
Start: 2025-05-21 | End: 2025-05-21

## 2025-05-21 RX ORDER — CYCLOBENZAPRINE HCL 5 MG
5 TABLET ORAL 3 TIMES DAILY PRN
Qty: 30 TABLET | Refills: 0 | Status: SHIPPED | OUTPATIENT
Start: 2025-05-21 | End: 2025-05-28 | Stop reason: SDUPTHER

## 2025-05-21 RX ORDER — GABAPENTIN 300 MG/1
300 CAPSULE ORAL 3 TIMES DAILY PRN
Qty: 20 CAPSULE | Refills: 0 | Status: SHIPPED | OUTPATIENT
Start: 2025-05-21 | End: 2025-05-28 | Stop reason: SDUPTHER

## 2025-05-21 RX ORDER — DEXMEDETOMIDINE HYDROCHLORIDE 100 UG/ML
INJECTION, SOLUTION INTRAVENOUS AS NEEDED
Status: DISCONTINUED | OUTPATIENT
Start: 2025-05-21 | End: 2025-05-21

## 2025-05-21 RX ORDER — METOCLOPRAMIDE HYDROCHLORIDE 5 MG/ML
10 INJECTION INTRAMUSCULAR; INTRAVENOUS ONCE AS NEEDED
Status: DISCONTINUED | OUTPATIENT
Start: 2025-05-21 | End: 2025-05-21 | Stop reason: HOSPADM

## 2025-05-21 RX ORDER — LIDOCAINE HYDROCHLORIDE 10 MG/ML
INJECTION, SOLUTION INFILTRATION; PERINEURAL AS NEEDED
Status: DISCONTINUED | OUTPATIENT
Start: 2025-05-21 | End: 2025-05-21 | Stop reason: HOSPADM

## 2025-05-21 RX ORDER — GABAPENTIN 600 MG/1
600 TABLET ORAL ONCE
Status: COMPLETED | OUTPATIENT
Start: 2025-05-21 | End: 2025-05-21

## 2025-05-21 RX ORDER — ALBUTEROL SULFATE 0.83 MG/ML
2.5 SOLUTION RESPIRATORY (INHALATION) ONCE AS NEEDED
Status: DISCONTINUED | OUTPATIENT
Start: 2025-05-21 | End: 2025-05-21 | Stop reason: HOSPADM

## 2025-05-21 RX ORDER — ONDANSETRON HYDROCHLORIDE 2 MG/ML
4 INJECTION, SOLUTION INTRAVENOUS ONCE AS NEEDED
Status: DISCONTINUED | OUTPATIENT
Start: 2025-05-21 | End: 2025-05-21 | Stop reason: HOSPADM

## 2025-05-21 RX ADMIN — PROPOFOL 30 MG: 10 INJECTION, EMULSION INTRAVENOUS at 09:19

## 2025-05-21 RX ADMIN — PROPOFOL 50 MG: 10 INJECTION, EMULSION INTRAVENOUS at 09:18

## 2025-05-21 RX ADMIN — MIDAZOLAM 2 MG: 1 INJECTION INTRAMUSCULAR; INTRAVENOUS at 09:10

## 2025-05-21 RX ADMIN — Medication 200 MCG: at 09:54

## 2025-05-21 RX ADMIN — GABAPENTIN 600 MG: 600 TABLET, FILM COATED ORAL at 08:26

## 2025-05-21 RX ADMIN — Medication 120 MCG: at 09:45

## 2025-05-21 RX ADMIN — DEXMEDETOMIDINE HYDROCHLORIDE 5 MCG: 100 INJECTION, SOLUTION INTRAVENOUS at 09:10

## 2025-05-21 RX ADMIN — FENTANYL CITRATE 50 MCG: 50 INJECTION, SOLUTION INTRAMUSCULAR; INTRAVENOUS at 09:28

## 2025-05-21 RX ADMIN — PROPOFOL 80 MG: 10 INJECTION, EMULSION INTRAVENOUS at 09:33

## 2025-05-21 RX ADMIN — Medication 120 MCG: at 10:09

## 2025-05-21 RX ADMIN — PROPOFOL 50 MG: 10 INJECTION, EMULSION INTRAVENOUS at 09:20

## 2025-05-21 RX ADMIN — LIDOCAINE HYDROCHLORIDE 80 MG: 20 INJECTION INTRAVENOUS at 09:11

## 2025-05-21 RX ADMIN — Medication 200 MCG: at 10:20

## 2025-05-21 RX ADMIN — DEXAMETHASONE SODIUM PHOSPHATE 4 MG: 4 INJECTION INTRA-ARTICULAR; INTRALESIONAL; INTRAMUSCULAR; INTRAVENOUS; SOFT TISSUE at 09:18

## 2025-05-21 RX ADMIN — PROPOFOL 50 MG: 10 INJECTION, EMULSION INTRAVENOUS at 09:31

## 2025-05-21 RX ADMIN — PROPOFOL 250 MCG/KG/MIN: 10 INJECTION, EMULSION INTRAVENOUS at 09:46

## 2025-05-21 RX ADMIN — Medication 120 MCG: at 09:48

## 2025-05-21 RX ADMIN — PROPOFOL 150 MCG/KG/MIN: 10 INJECTION, EMULSION INTRAVENOUS at 09:18

## 2025-05-21 RX ADMIN — ACETAMINOPHEN 975 MG: 325 TABLET, FILM COATED ORAL at 08:26

## 2025-05-21 RX ADMIN — SODIUM CHLORIDE, POTASSIUM CHLORIDE, SODIUM LACTATE AND CALCIUM CHLORIDE 50 ML/HR: 600; 310; 30; 20 INJECTION, SOLUTION INTRAVENOUS at 08:28

## 2025-05-21 RX ADMIN — FENTANYL CITRATE 50 MCG: 50 INJECTION, SOLUTION INTRAMUSCULAR; INTRAVENOUS at 09:18

## 2025-05-21 SDOH — HEALTH STABILITY: MENTAL HEALTH: CURRENT SMOKER: 1

## 2025-05-21 ASSESSMENT — ENCOUNTER SYMPTOMS
FATIGUE: 0
DIZZINESS: 0
HEADACHES: 0
ABDOMINAL PAIN: 0
FEVER: 0
VOMITING: 0
NAUSEA: 0
SHORTNESS OF BREATH: 0
CHILLS: 0

## 2025-05-21 ASSESSMENT — PAIN SCALES - GENERAL: PAINLEVEL_OUTOF10: 2

## 2025-05-21 ASSESSMENT — COLUMBIA-SUICIDE SEVERITY RATING SCALE - C-SSRS
1. IN THE PAST MONTH, HAVE YOU WISHED YOU WERE DEAD OR WISHED YOU COULD GO TO SLEEP AND NOT WAKE UP?: NO
2. HAVE YOU ACTUALLY HAD ANY THOUGHTS OF KILLING YOURSELF?: NO
6. HAVE YOU EVER DONE ANYTHING, STARTED TO DO ANYTHING, OR PREPARED TO DO ANYTHING TO END YOUR LIFE?: NO

## 2025-05-21 ASSESSMENT — PAIN - FUNCTIONAL ASSESSMENT: PAIN_FUNCTIONAL_ASSESSMENT: 0-10

## 2025-05-21 NOTE — ANESTHESIA PREPROCEDURE EVALUATION
"Patient: Elsy Claire \"Ania\"    Procedure Information       Date/Time: 05/21/25 0925    Procedure: Colposcopy with LEEP procedure    Location: [facility] [place] OR 05 / Virtual [facility] OR    Surgeons: Guillermo Wilson MD            Relevant Problems   Pulmonary   (+) Mild asthma (HHS-HCC)      Neuro   (+) Anxiety and depression       Clinical information reviewed:   Tobacco  Allergies  Meds  Problems  Med Hx  Surg Hx  OB Status    Fam Hx  Soc Hx        NPO Detail:  NPO/Void Status  Date of Last Liquid: 05/21/25  Time of Last Liquid: 0600  Date of Last Solid: 05/20/25  Time of Last Solid: 2130  Last Intake Type: Clear fluids  Time of Last Void: 0807         Physical Exam    Airway  Mallampati: I  TM distance: >3 FB  Neck ROM: full  Mouth opening: 3 or more finger widths     Cardiovascular   Rhythm: regular  Rate: normal     Dental - normal exam     Pulmonary Breath sounds clear to auscultation     Abdominal            Anesthesia Plan    History of general anesthesia?: yes  History of complications of general anesthesia?: no    ASA 2     MAC     The patient is a current smoker. (medical marijuana)  Patient was previously instructed to abstain from smoking on day of procedure.  Patient did not smoke on day of procedure.    intravenous induction   Postoperative pain plan includes opioids.  Anesthetic plan and risks discussed with patient.  Use of blood products discussed with patient who consented to blood products.    Plan discussed with CRNA.      "

## 2025-05-21 NOTE — OP NOTE
"Colposcopy with LEEP procedure Operative Note     Date: 2025  [place] [facility] [place] [facility] [place]    Name: Elsy Claire \"Ania\", : 1995, Age: 29 y.o., MRN: [id number], Sex: adult    Diagnosis  Pre-op Diagnosis      * High grade squamous intraepithelial lesion (HGSIL), grade 3 IVA, on biopsy of cervix [D06.9] Post-op Diagnosis     * High grade squamous intraepithelial lesion (HGSIL), grade 3 IVA, on biopsy of cervix [D06.9]     Procedures  Colposcopy with LEEP procedure  27669 - [place] COLPOSCOPY CERVIX VAG LOOP ELTRD BX CERVIX      Surgeons      * Guillermo Wilson - Primary    Resident/Fellow/Other Assistant:  Surgeons and Role:  * No surgeons found with a matching role *    Staff:   Circulator: Elayne Sanchez Person: Delilah    Anesthesia Staff: Anesthesiologist: Naty Montano MD  CRNA: DAINA Schroeder-CRNA  SRNA: Sree Martin    Procedure Summary  Anesthesia: Monitor Anesthesia Care  ASA: II  Estimated Blood Loss: 5mL  Intra-op Medications:   Administrations occurring from 0925 to 1015 on 25:   Medication Name Total Dose   acetic acid (bulk) external solution 30 mL   surgical lubricant gel 1 Application   ferric subsulfate (Astringyn) solution 1 Application   lidocaine (Xylocaine) 10 mg/mL (1 %) injection 20 mL   fentaNYL (Sublimaze) injection 50 mcg/mL 50 mcg   phenylephrine 40 mcg/mL syringe 10 mL 560 mcg   propofol (Diprivan) injection 10 mg/mL 602.5 mg              Anesthesia Record               Intraprocedure I/O Totals          Intake    lactated Ringer's infusion 500.00 mL    Total Intake 500 mL       Output    Est. Blood Loss 5 mL    Total Output 5 mL       Net    Net Volume 495 mL          Specimen:   ID Type Source Tests Collected by Time   1 : ANTERIOR LIP OF CERVIX STICH AT 12 Tissue CERVIX LEEP SURGICAL PATHOLOGY EXAM Guillermo Wilson MD 2025 0952   2 : POSTERIOR LIP OF CERVIX Tissue CERVIX LEEP SURGICAL PATHOLOGY EXAM Guilelrmo Wilson MD 2025 0955   3 : " "ENDOCERVIX CORE BIOPSY Tissue ENDOCERVIX LEEP SURGICAL PATHOLOGY EXAM Guillermo Wilson MD 5/21/2025 0958   4 : ENDOCERVIX CURRETTINGS Tissue ENDOCERVIX CURETTINGS SURGICAL PATHOLOGY EXAM Guillermo Wilson MD 5/21/2025 0959                 Drains and/or Catheters: * None in log *    Tourniquet Times: N/A        Implants: N/A    Findings:     Normal external female genitalia  Vagina and cervix without gross lesion  Cervix and and squamocolumnar junction fully visualized  Patchy aceto white lesions circumferentially noted on colposcopy  Hemostasis along all incision lines was observed at the close of the case      Indications: Elsy Claire \"Ania\" is an 29 y.o. adult who is having surgery for High grade squamous intraepithelial lesion (HGSIL), grade 3 IVA, on biopsy of cervix [D06.9].     The patient was seen in the preoperative area. The risks, benefits, complications, treatment options, non-operative alternatives, expected recovery and outcomes were discussed with the patient. The possibilities of reaction to medication, pulmonary aspiration, injury to surrounding structures, bleeding, recurrent infection, the need for additional procedures, failure to diagnose a condition, and creating a complication requiring transfusion or operation were discussed with the patient. The patient concurred with the proposed plan, giving informed consent.  The site of surgery was properly noted/marked if necessary per policy. The patient has been actively warmed in preoperative area. Preoperative antibiotics are not indicated. Venous thrombosis prophylaxis have been ordered including bilateral sequential compression devices    Procedure Details:     The patient was taken to the operating room where MAC anesthesia was administered. The patient was then placed in the dorsal lithotomy position.  Next, a coated speculum was placed into the vagina the cervix visualized.   No gross cervical abnormalities were appreciated.  " Acetic acid was applied to the cervix.  Findings under colposcopic exam as above.  An intracervical block was then injected with 10 mL of 1% lidocaine and a paracervical block with 10cc of the 1% lidocaine was applied as well.  LEEP was performed in 2 passes.  The anterior specimen was tagged at 12 o'clock with a stitch, the posterior specimen was left unoriented.  An ECC was performed.  The LEEP bed was coagulated with the Bovie roller ball.  Monsel's solution was then applied.  Good hemostasis was noted. The speculum was removed. All counts were correct.  The specimen was sent to pathology.  I was present for entire procedure.    The patient was taken from the operating room in stable condition after reversal of anesthesia.    Evidence of Infection: No   Complications:  None; patient tolerated the procedure well.    Disposition: PACU - hemodynamically stable.  Condition: stable                 Additional Details: N/A    Attending Attestation: I performed the procedure.    Guillermo Wilson  Phone Number: [phone]

## 2025-05-21 NOTE — ANESTHESIA POSTPROCEDURE EVALUATION
"Patient: Elsy Claire \"Ania\"    Procedure Summary       Date: 05/21/25 Room / Location: [facility] [place] [facility] 05 / Virtual [facility] [place] [facility]    Anesthesia Start: 0911 Anesthesia Stop: 1025    Procedure: Colposcopy with LEEP procedure (Vagina ) Diagnosis:       High grade squamous intraepithelial lesion (HGSIL), grade 3 IVA, on biopsy of cervix      (High grade squamous intraepithelial lesion (HGSIL), grade 3 IVA, on biopsy of cervix [D06.9])    Surgeons: Guillermo Wilson MD Responsible Provider: Naty Montano MD    Anesthesia Type: MAC ASA Status: 2            Anesthesia Type: MAC    Vitals Value Taken Time   BP 89/50 05/21/25 10:32   Temp 36.1 °C (97 °F) 05/21/25 10:18   Pulse 62 05/21/25 10:32   Resp 16 05/21/25 10:32   SpO2 99 % 05/21/25 10:32       Anesthesia Post Evaluation    Patient location during evaluation: PACU  Patient participation: complete - patient participated  Level of consciousness: awake and alert  Pain management: adequate  Airway patency: patent  Cardiovascular status: acceptable  Respiratory status: acceptable  Hydration status: acceptable  Postoperative Nausea and Vomiting: none        There were no known notable events for this encounter.    "

## 2025-05-21 NOTE — H&P
History Of Present Illness  Ania Claire is a 29 y.o. adult presenting for scheduled ambulatory surgery.     Past Medical History  Ania has a past medical history of ADHD (attention deficit hyperactivity disorder) (Chilhood but treated starting this ), Allergic (seasonal and bugs), Anxiety (Childhood), Asthma (Around age 18), Depression (Childhood), GERD (gastroesophageal reflux disease) (Mild), Hypermobility syndrome, and Visual impairment (I wear glasses since I was a kid).    Surgical History  Ania has a past surgical history that includes [place] tooth extraction ().     OB History  OB History    Para Term  AB Living   0 0 0 0 0 0   SAB IAB Ectopic Multiple Live Births   0 0 0 0 0       Sexual History  Social History     Substance and Sexual Activity   Sexual Activity Yes    Partners: Female, Male    Birth control/protection: Condom Male, Emergency Contraception, I.U.D.    Comment: I have a female nesting partner. We are poly and see others        Social History  Ania reports that Ania has never smoked. Ania has never used smokeless tobacco. Ania reports that Ania does not currently use alcohol. Ania reports current drug use. Frequency: 7.00 times per week. Drug: Marijuana.    Family History  Family History[1]     Allergies  Griseofulvin, Bee venom protein (honey bee), and Adhesive tape-silicones    Review of Systems   Constitutional:  Negative for chills, fatigue and fever.   Respiratory:  Negative for shortness of breath.    Cardiovascular:  Negative for chest pain.   Gastrointestinal:  Negative for abdominal pain, nausea and vomiting.   Genitourinary:  Negative for menstrual problem, pelvic pain and vaginal discharge.   Neurological:  Negative for dizziness and headaches.   All other systems reviewed and are negative.       Physical Exam  Vitals reviewed.   Constitutional:       General: Ania is not in acute distress.     Appearance: Ania is not ill-appearing.   Pulmonary:  "     Effort: Pulmonary effort is normal.   Skin:     Coloration: Skin is not pale.   Neurological:      Mental Status: [facility] is alert.          Last Recorded Vitals  Blood pressure 137/85, pulse 82, temperature 36.4 °C (97.5 °F), temperature source Temporal, resp. rate 16, height 1.702 m (5' 7\"), weight 90.1 kg (198 lb 10.2 oz), SpO2 97%.    Relevant Results  Medications  Current Medications[2]    Labs  CBC  No results found for: \"WBC\", \"NRBC\", \"RBC\", \"HGB\", \"HCT\", \"MCV\", \"MCH\", \"MCHC\", \"RDW\", \"PLT\", \"MPV\"    CMP  No results found for: \"GLUCOSE\", \"NA\", \"K\", \"CL\", \"CO2\", \"ANIONGAP\", \"BUN\", \"CREATININE\", \"EGFR\", \"CALCIUM\", \"ALBUMIN\", \"ALKPHOS\", \"PROT\", \"AST\", \"BILITOT\", \"ALT\"    Coagulation   No results found for: \"PROTIME\", \"INR\", \"APTT\", \"FIBRINOGEN\"    Pregnancy Tests  No results found for: \"HCGQUANT\", \"HCGURINE\"    Imaging   Results for orders placed during the hospital encounter of 04/01/25    US PELVIS TRANSABDOMINAL WITH TRANSVAGINAL    Narrative  Interpreted By:  Anton Montez,  STUDY:  US PELVIS TRANSABDOMINAL WITH TRANSVAGINAL;  4/1/2025 9:00 am    INDICATION:  Signs/Symptoms:pelvic pain, AUB.    ,N93.9 Abnormal uterine and vaginal bleeding, unspecified,R10.2  Pelvic and perineal pain    COMPARISON:  None.    ACCESSION NUMBER(S):  [id number]    ORDERING CLINICIAN:  SWETA BOYER    TECHNIQUE:  Multiple multiplanar static gray scale, color and spectral waveform  sonographic images of the pelvis were obtained.  Transabdominal and  endovaginal ultrasound was performed.    FINDINGS:  UTERUS:  Uterus is normal in size with a smooth external contour measuring  6.6 x 2.9 x 5 cm.  No discrete myometrial lesion is identified.    ENDOMETRIUM:  Central uterine linear hyperechoic T-shaped IUD appears appropriately  positioned visible to the fundal aspect of the endometrial complex.  Endometrial complex is not abnormally thickened measuring 4 mm.    RIGHT ADNEXA:  Right ovary is normal in size containing small " physiologic  cysts/follicles. Right ovary measures  3.2 x 2.7 x 1.5 cm. Blood flow  can be seen to the right ovary.   No additional adnexal mass or fluid  collection is seen.    LEFT ADNEXA:  Left ovary is normal in size containing small physiologic  cysts/follicles. Left ovary measures  2.4 x 2.8 x 2.1 cm. Blood flow  can be demonstrated to the left ovary.   No additional adnexal mass  or fluid collection is seen.    CUL DE SAC:  No free fluid is visualized.    Impression  1.  Central uterine IUD appears appropriately positioned. No abnormal  endometrial thickening.  2. Normal appearance of the ovaries with demonstrated blood flow.    MACRO:  None.    Signed by: Anton Montez 4/1/2025 3:44 PM  Dictation workstation:   [id number]         Assessment/Plan   Assessment & Plan  High grade squamous intraepithelial lesion (HGSIL), grade 3 IVA, on biopsy of cervix    Mild asthma (HHS-HCC)      29-year-old patient with IVA-3 here for LEEP procedure.  Procedural details and risks reviewed.  All questions asked and answered.  Will proceed with ambulatory procedure         Guillermo Wilson MD         [1]   Family History  Problem Relation Name Age of Onset    Arthritis Mother Arelis Antunez     Cancer Mother Arelis Antunez     Depression Mother Arelis Chaogomery     Hypertension Mother Arelis Antunez     Mental illness Mother Arelis Ordonezmery     Hypertension Father Ryan Dakotah     Mental illness Father Ryan Dakotah     Arthritis Maternal Grandmother Arelis Antunez     Depression Maternal Grandmother Arelis Antunez     Mental illness Maternal Grandmother Arelis Antunez     Cancer Paternal Grandmother Adele Dakotah     Depression Brother Patrick Dakotah     Mental illness Brother Patrick Dakotah    [2]   Current Facility-Administered Medications:     celecoxib (CeleBREX) capsule 400 mg, 400 mg, oral, Once, Guillermo Wilson MD    lactated Ringer's infusion, 50 mL/hr, intravenous, Continuous, Soozan S  MD Kaylyn, Last Rate: 50 mL/hr at 05/21/25 0828, 50 mL/hr at 05/21/25 0828

## 2025-05-22 ENCOUNTER — PATIENT MESSAGE (OUTPATIENT)
Dept: OBSTETRICS AND GYNECOLOGY | Facility: CLINIC | Age: 30
End: 2025-05-22
Payer: COMMERCIAL

## 2025-05-22 DIAGNOSIS — Z98.890 S/P LEEP (LOOP ELECTROSURGICAL EXCISION PROCEDURE): ICD-10-CM

## 2025-05-23 ENCOUNTER — APPOINTMENT (OUTPATIENT)
Dept: CARDIOLOGY | Facility: CLINIC | Age: 30
End: 2025-05-23
Payer: COMMERCIAL

## 2025-05-27 ENCOUNTER — TELEPHONE (OUTPATIENT)
Dept: RADIOLOGY | Facility: CLINIC | Age: 30
End: 2025-05-27
Payer: COMMERCIAL

## 2025-05-27 ENCOUNTER — PATIENT MESSAGE (OUTPATIENT)
Dept: OBSTETRICS AND GYNECOLOGY | Facility: CLINIC | Age: 30
End: 2025-05-27
Payer: COMMERCIAL

## 2025-05-27 DIAGNOSIS — F41.8 ANXIETY ABOUT HEALTH: ICD-10-CM

## 2025-05-27 DIAGNOSIS — F41.0 PANIC ATTACKS: ICD-10-CM

## 2025-05-27 RX ORDER — CLONAZEPAM 0.5 MG/1
0.5 TABLET ORAL 2 TIMES DAILY PRN
Qty: 30 TABLET | Refills: 0 | Status: SHIPPED | OUTPATIENT
Start: 2025-05-27 | End: 2025-11-23

## 2025-05-28 RX ORDER — CYCLOBENZAPRINE HCL 5 MG
5 TABLET ORAL 3 TIMES DAILY PRN
Qty: 30 TABLET | Refills: 0 | Status: SHIPPED | OUTPATIENT
Start: 2025-05-28

## 2025-05-28 RX ORDER — ONDANSETRON 4 MG/1
4 TABLET, FILM COATED ORAL EVERY 6 HOURS PRN
Qty: 20 TABLET | Refills: 0 | Status: SHIPPED | OUTPATIENT
Start: 2025-05-28 | End: 2025-06-04

## 2025-05-28 RX ORDER — GABAPENTIN 300 MG/1
300 CAPSULE ORAL 3 TIMES DAILY PRN
Qty: 20 CAPSULE | Refills: 0 | Status: SHIPPED | OUTPATIENT
Start: 2025-05-28 | End: 2026-05-28

## 2025-06-02 ENCOUNTER — PATIENT MESSAGE (OUTPATIENT)
Dept: OBSTETRICS AND GYNECOLOGY | Facility: CLINIC | Age: 30
End: 2025-06-02
Payer: COMMERCIAL

## 2025-06-03 LAB
LABORATORY COMMENT REPORT: NORMAL
PATH REPORT.FINAL DX SPEC: NORMAL
PATH REPORT.GROSS SPEC: NORMAL
PATH REPORT.RELEVANT HX SPEC: NORMAL
PATH REPORT.TOTAL CANCER: NORMAL

## 2025-06-04 ENCOUNTER — PATIENT MESSAGE (OUTPATIENT)
Dept: OBSTETRICS AND GYNECOLOGY | Facility: CLINIC | Age: 30
End: 2025-06-04
Payer: COMMERCIAL

## 2025-06-04 DIAGNOSIS — Z98.890 S/P LEEP (LOOP ELECTROSURGICAL EXCISION PROCEDURE): ICD-10-CM

## 2025-06-11 RX ORDER — GABAPENTIN 300 MG/1
300 CAPSULE ORAL 3 TIMES DAILY PRN
Qty: 90 CAPSULE | Refills: 3 | Status: SHIPPED | OUTPATIENT
Start: 2025-06-11 | End: 2026-06-11

## 2025-06-13 ENCOUNTER — PATIENT MESSAGE (OUTPATIENT)
Dept: PRIMARY CARE | Facility: CLINIC | Age: 30
End: 2025-06-13
Payer: COMMERCIAL

## 2025-06-13 DIAGNOSIS — F32.A DEPRESSION, UNSPECIFIED DEPRESSION TYPE: ICD-10-CM

## 2025-06-13 DIAGNOSIS — F90.0 ATTENTION DEFICIT HYPERACTIVITY DISORDER (ADHD), PREDOMINANTLY INATTENTIVE TYPE: ICD-10-CM

## 2025-06-13 RX ORDER — BUPROPION HYDROCHLORIDE 150 MG/1
150 TABLET ORAL EVERY MORNING
Qty: 90 TABLET | Refills: 3 | Status: SHIPPED | OUTPATIENT
Start: 2025-06-13 | End: 2026-06-08

## 2025-06-13 RX ORDER — LISDEXAMFETAMINE DIMESYLATE 50 MG/1
50 CAPSULE ORAL EVERY MORNING
Qty: 30 CAPSULE | Refills: 0 | Status: SHIPPED | OUTPATIENT
Start: 2025-06-13 | End: 2025-07-13

## 2025-06-20 DIAGNOSIS — F32.A ANXIETY AND DEPRESSION: ICD-10-CM

## 2025-06-20 DIAGNOSIS — F41.9 ANXIETY AND DEPRESSION: ICD-10-CM

## 2025-06-20 DIAGNOSIS — R00.2 PALPITATIONS: ICD-10-CM

## 2025-06-22 RX ORDER — PROPRANOLOL HYDROCHLORIDE 20 MG/1
20 TABLET ORAL 2 TIMES DAILY
Qty: 60 TABLET | Refills: 0 | Status: SHIPPED | OUTPATIENT
Start: 2025-06-22

## 2025-06-27 ENCOUNTER — PREP FOR PROCEDURE (OUTPATIENT)
Dept: OBSTETRICS AND GYNECOLOGY | Facility: CLINIC | Age: 30
End: 2025-06-27
Payer: COMMERCIAL

## 2025-06-27 DIAGNOSIS — N93.9 ABNORMAL UTERINE BLEEDING (AUB): Primary | ICD-10-CM

## 2025-06-27 RX ORDER — GABAPENTIN 600 MG/1
600 TABLET ORAL ONCE
OUTPATIENT
Start: 2025-06-27 | End: 2025-06-27

## 2025-06-27 RX ORDER — ACETAMINOPHEN 325 MG/1
975 TABLET ORAL ONCE
OUTPATIENT
Start: 2025-06-27 | End: 2025-06-27

## 2025-06-27 RX ORDER — METRONIDAZOLE 500 MG/100ML
500 INJECTION, SOLUTION INTRAVENOUS ONCE
OUTPATIENT
Start: 2025-06-27 | End: 2025-06-27

## 2025-06-27 RX ORDER — CELECOXIB 400 MG/1
400 CAPSULE ORAL ONCE
OUTPATIENT
Start: 2025-06-27 | End: 2025-06-27

## 2025-06-27 RX ORDER — CEFAZOLIN SODIUM 2 G/100ML
2 INJECTION, SOLUTION INTRAVENOUS ONCE
OUTPATIENT
Start: 2025-06-27 | End: 2025-06-27

## 2025-07-02 PROBLEM — N93.9 ABNORMAL UTERINE BLEEDING (AUB): Status: ACTIVE | Noted: 2025-06-27

## 2025-07-07 DIAGNOSIS — F32.A ANXIETY AND DEPRESSION: Chronic | ICD-10-CM

## 2025-07-07 DIAGNOSIS — F41.9 ANXIETY AND DEPRESSION: Chronic | ICD-10-CM

## 2025-07-07 DIAGNOSIS — F51.01 PRIMARY INSOMNIA: ICD-10-CM

## 2025-07-07 RX ORDER — TRAZODONE HYDROCHLORIDE 50 MG/1
50 TABLET ORAL NIGHTLY
Qty: 90 TABLET | Refills: 0 | Status: SHIPPED | OUTPATIENT
Start: 2025-07-07

## 2025-07-07 RX ORDER — SERTRALINE HYDROCHLORIDE 100 MG/1
200 TABLET, FILM COATED ORAL DAILY
Qty: 90 TABLET | Refills: 2 | Status: SHIPPED | OUTPATIENT
Start: 2025-07-07

## 2025-07-08 ENCOUNTER — PREP FOR PROCEDURE (OUTPATIENT)
Dept: OBSTETRICS AND GYNECOLOGY | Facility: CLINIC | Age: 30
End: 2025-07-08
Payer: COMMERCIAL

## 2025-07-10 ENCOUNTER — APPOINTMENT (OUTPATIENT)
Dept: OBSTETRICS AND GYNECOLOGY | Facility: CLINIC | Age: 30
End: 2025-07-10
Payer: COMMERCIAL

## 2025-07-14 ENCOUNTER — PATIENT MESSAGE (OUTPATIENT)
Dept: PRIMARY CARE | Facility: CLINIC | Age: 30
End: 2025-07-14
Payer: COMMERCIAL

## 2025-07-14 DIAGNOSIS — F90.0 ATTENTION DEFICIT HYPERACTIVITY DISORDER (ADHD), PREDOMINANTLY INATTENTIVE TYPE: ICD-10-CM

## 2025-07-14 RX ORDER — LISDEXAMFETAMINE DIMESYLATE 50 MG/1
50 CAPSULE ORAL EVERY MORNING
Qty: 30 CAPSULE | Refills: 0 | Status: SHIPPED | OUTPATIENT
Start: 2025-07-14 | End: 2025-08-13

## 2025-07-15 ENCOUNTER — APPOINTMENT (OUTPATIENT)
Dept: OBSTETRICS AND GYNECOLOGY | Facility: CLINIC | Age: 30
End: 2025-07-15
Payer: COMMERCIAL

## 2025-07-15 DIAGNOSIS — R00.2 PALPITATIONS: ICD-10-CM

## 2025-07-15 DIAGNOSIS — F41.9 ANXIETY AND DEPRESSION: ICD-10-CM

## 2025-07-15 DIAGNOSIS — F32.A ANXIETY AND DEPRESSION: ICD-10-CM

## 2025-07-15 RX ORDER — PROPRANOLOL HYDROCHLORIDE 20 MG/1
20 TABLET ORAL 2 TIMES DAILY
Qty: 60 TABLET | Refills: 11 | Status: SHIPPED | OUTPATIENT
Start: 2025-07-15

## 2025-07-18 ENCOUNTER — APPOINTMENT (OUTPATIENT)
Dept: OBSTETRICS AND GYNECOLOGY | Facility: CLINIC | Age: 30
End: 2025-07-18
Payer: COMMERCIAL

## 2025-07-18 ENCOUNTER — PATIENT MESSAGE (OUTPATIENT)
Dept: OBSTETRICS AND GYNECOLOGY | Facility: CLINIC | Age: 30
End: 2025-07-18

## 2025-07-18 VITALS — DIASTOLIC BLOOD PRESSURE: 86 MMHG | WEIGHT: 203.4 LBS | BODY MASS INDEX: 31.86 KG/M2 | SYSTOLIC BLOOD PRESSURE: 133 MMHG

## 2025-07-18 DIAGNOSIS — Z98.890 S/P LEEP (LOOP ELECTROSURGICAL EXCISION PROCEDURE): Primary | ICD-10-CM

## 2025-07-18 DIAGNOSIS — Z87.410 HISTORY OF CERVICAL DYSPLASIA: ICD-10-CM

## 2025-07-18 DIAGNOSIS — N93.9 ABNORMAL UTERINE BLEEDING (AUB): ICD-10-CM

## 2025-07-18 DIAGNOSIS — Z01.818 PREOPERATIVE EXAM FOR GYNECOLOGIC SURGERY: ICD-10-CM

## 2025-07-18 PROCEDURE — 1036F TOBACCO NON-USER: CPT | Performed by: STUDENT IN AN ORGANIZED HEALTH CARE EDUCATION/TRAINING PROGRAM

## 2025-07-18 PROCEDURE — 99213 OFFICE O/P EST LOW 20 MIN: CPT | Performed by: STUDENT IN AN ORGANIZED HEALTH CARE EDUCATION/TRAINING PROGRAM

## 2025-07-18 ASSESSMENT — PAIN SCALES - GENERAL: PAINLEVEL_OUTOF10: 0-NO PAIN

## 2025-07-18 NOTE — PROGRESS NOTES
"Subjective   Patient ID: Elsy Claire \"Ania\" is a 29 y.o. adult who presents for Post-op (Patient here for post op for leep procedure and to discuss hysterectomy.).  Patient here for postoperative visit after LEEP procedure.  Patient with some questions about upcoming hysterectomy.        Review of Systems   All other systems reviewed and are negative.      Objective   Physical Exam  Vitals reviewed.   Constitutional:       General: Ania is not in acute distress.     Appearance: Ania is not ill-appearing.   Pulmonary:      Effort: Pulmonary effort is normal.     Skin:     Coloration: Skin is not pale.     Neurological:      Mental Status: Ania is alert.         Assessment/Plan   Diagnoses and all orders for this visit:  S/P LEEP (loop electrosurgical excision procedure)  Abnormal uterine bleeding (AUB)  Preoperative exam for gynecologic surgery  History of cervical dysplasia    Patient here for postoperative visit after LEEP procedure.  Pathology reviewed and consistent IVA-2/3 with negative margins.  Patient is for laparoscopic hysterectomy for AUB.  Procedural details and risks reviewed with patient also discussed recovery and time off work.  Patient to follow-up as needed surgery is scheduled for September 10       Guillermo Wilson MD 07/18/25 4:35 PM   "

## 2025-07-21 ENCOUNTER — PATIENT MESSAGE (OUTPATIENT)
Dept: OBSTETRICS AND GYNECOLOGY | Facility: CLINIC | Age: 30
End: 2025-07-21
Payer: COMMERCIAL

## 2025-07-22 ENCOUNTER — APPOINTMENT (OUTPATIENT)
Dept: PRIMARY CARE | Facility: CLINIC | Age: 30
End: 2025-07-22
Payer: COMMERCIAL

## 2025-07-22 DIAGNOSIS — Z91.030 BEE ALLERGY STATUS: ICD-10-CM

## 2025-07-22 DIAGNOSIS — F41.9 ANXIETY AND DEPRESSION: Primary | ICD-10-CM

## 2025-07-22 DIAGNOSIS — F90.0 ATTENTION DEFICIT HYPERACTIVITY DISORDER (ADHD), PREDOMINANTLY INATTENTIVE TYPE: ICD-10-CM

## 2025-07-22 DIAGNOSIS — N87.1 CIN II (CERVICAL INTRAEPITHELIAL NEOPLASIA II): ICD-10-CM

## 2025-07-22 DIAGNOSIS — Z51.81 MEDICATION MONITORING ENCOUNTER: ICD-10-CM

## 2025-07-22 DIAGNOSIS — F32.A ANXIETY AND DEPRESSION: Primary | ICD-10-CM

## 2025-07-22 DIAGNOSIS — Z98.890 S/P LEEP (LOOP ELECTROSURGICAL EXCISION PROCEDURE): ICD-10-CM

## 2025-07-22 DIAGNOSIS — Z87.410 HISTORY OF CERVICAL DYSPLASIA: ICD-10-CM

## 2025-07-22 PROCEDURE — 1036F TOBACCO NON-USER: CPT | Performed by: STUDENT IN AN ORGANIZED HEALTH CARE EDUCATION/TRAINING PROGRAM

## 2025-07-22 PROCEDURE — 99214 OFFICE O/P EST MOD 30 MIN: CPT | Performed by: STUDENT IN AN ORGANIZED HEALTH CARE EDUCATION/TRAINING PROGRAM

## 2025-07-22 RX ORDER — EPINEPHRINE 0.3 MG/.3ML
1 INJECTION SUBCUTANEOUS ONCE AS NEEDED
Qty: 2 EACH | Refills: 0 | Status: SHIPPED | OUTPATIENT
Start: 2025-07-22

## 2025-07-22 NOTE — PROGRESS NOTES
"Subjective   Patient ID: Elsy Claire \"Ania\" is a 30 y.o. adult who presents for Med Management.  History of Present Illness  Elsy Claire \"Luciano" is a 30 year old with depression and ADHD who presents for medication management and upcoming hysterectomy.    Ania is scheduled for a hysterectomy in September (IVA-2) and feels positive about the procedure. They have discussed the need for extra time off work for recovery due to a history of poor recovery from procedures. They are relieved that their insurance will cover most of the costs, reducing financial stress.    The addition of Wellbutrin to their medication regimen has been beneficial for depression, particularly in improving motivation and energy levels. They describe significant symptoms of depression as a lack of motivation and energy, stating that they 'don't want to move' and lack the energy to engage in hobbies. They are currently on the lowest dose of Wellbutrin and have not experienced any noticeable side effects.    They are currently taking Vyvanse for ADHD and report that the current dose is effective without any side effects. They mention typically getting less than seven hours of sleep, attributing this to their natural sleep pattern rather than the medication.    They are planning a trip out of state for a week and have requested a prescription for an EpiPen, as they have not carried one for some time. They will be staying in a condo arranged by a family friend for their th[age]ieth birthday and plan to take friends along for the trip.    Review of Systems  ROS otherwise negative aside from what was mentioned above in HPI.    Objective     There were no vitals taken for this visit.     Current Outpatient Medications   Medication Instructions    albuterol 90 mcg/actuation inhaler 2 puffs, inhalation, 3 times daily RT    buPROPion XL (WELLBUTRIN XL) 150 mg, oral, Every morning, Do not crush, chew, or split.    clonazePAM (KLONOPIN) " 0.5 mg, oral, 2 times daily PRN    cyclobenzaprine (FLEXERIL) 5 mg, oral, 3 times daily PRN    EPINEPHrine (EPIPEN) 0.3 mg, intramuscular, Once as needed, Call 911 after use.    gabapentin (NEURONTIN) 300 mg, oral, 3 times daily PRN    hydrOXYzine pamoate (Vistaril) 25 mg capsule Take 1 capsule by mouth three times daily as needed for anxiety.    levonorgestrel (Mirena) 20 mcg/24hr IUD 1 each    lisdexamfetamine (VYVANSE) 50 mg, oral, Every morning    montelukast (SINGULAIR) 10 mg, oral, Nightly    propranolol (INDERAL) 20 mg, oral, 2 times daily    sertraline (ZOLOFT) 200 mg, oral, Daily    traZODone (DESYREL) 50 mg, oral, Nightly       Physical Exam  Complete physical exam deferred in setting of telehealth appointment    Patient sitting comfortably  No acute distress  Behavior normal      Results      Assessment & Plan  Severe menorrhagia, IVA 2-3  Scheduled for a hysterectomy in September as a solution for combination of desire for removal, abnormal LEEP and severe menstrual cramps. Recovery concerns discussed with their surgeon, and adequate time off work arranged. Insurance will cover the procedure, minimizing out-of-pocket expenses.  - Proceed with scheduled hysterectomy in September.    Depression  Improvement in depressive symptoms with Wellbutrin, experiencing increased motivation and interest in activities. No significant side effects. Prefers to maintain the current dose until post-hysterectomy, at which point they may consider adjusting their medication regimen, including potentially lowering the dose of Zoloft.  - Continue current dose of Wellbutrin.  - Reevaluate medication regimen post-hysterectomy.    Generalized Anxiety Disorder  No significant anxiety related to the upcoming hysterectomy, expressing excitement rather than apprehension. Adequate support and necessary time off work arranged.    Attention Deficit Hyperactivity Disorder (ADHD)  Current dose of Vyvanse is effective with no side effects.  Satisfied with current medication regimen.  - Continue current dose of Vyvanse.    Asthma  Inquired about obtaining an EpiPen for an upcoming out-of-state trip due to concern for potential allergic reactions. No recent asthma exacerbations or issues.  - Prescribe EpiPen and send prescription to [facility] pharmacy.    Follow up in 3 months    [facility] report reviewed for Elsy Claire today and is consistent with prescribed therapy.  We weighed the risks and benefit of this therapy and will continue with the current plan    This visit was completed via telephone/virtual visit. All issues as documented below were discussed and addressed but no physical exam was performed. If it was felt that the patient should be evaluated via  face-to-face office appointment(s) they were directed there. The patient verbally consented to this visit.    I performed this visit using real-time telehealth tools, including an audio/video connection between Elsy Claire  and Zarina Grigsby DO [facility].        Zarina Grigsby DO     This medical note was created with the assistance of artificial intelligence (AI) for documentation purposes. The content has been reviewed and confirmed by the healthcare provider for accuracy and completeness. Patient consented to the use of audio recording and use of AI during their visit.

## 2025-07-25 ENCOUNTER — PATIENT MESSAGE (OUTPATIENT)
Dept: PRIMARY CARE | Facility: CLINIC | Age: 30
End: 2025-07-25
Payer: COMMERCIAL

## 2025-07-25 DIAGNOSIS — R11.2 NAUSEA AND VOMITING, UNSPECIFIED VOMITING TYPE: Primary | ICD-10-CM

## 2025-07-25 RX ORDER — ONDANSETRON 4 MG/1
4 TABLET, ORALLY DISINTEGRATING ORAL EVERY 8 HOURS PRN
Qty: 20 TABLET | Refills: 0 | Status: SHIPPED | OUTPATIENT
Start: 2025-07-25 | End: 2025-08-01

## 2025-08-18 ENCOUNTER — PATIENT MESSAGE (OUTPATIENT)
Dept: PRIMARY CARE | Facility: CLINIC | Age: 30
End: 2025-08-18
Payer: COMMERCIAL

## 2025-08-18 DIAGNOSIS — F90.0 ATTENTION DEFICIT HYPERACTIVITY DISORDER (ADHD), PREDOMINANTLY INATTENTIVE TYPE: ICD-10-CM

## 2025-08-19 DIAGNOSIS — R06.2 WHEEZING: Primary | ICD-10-CM

## 2025-08-19 RX ORDER — LISDEXAMFETAMINE DIMESYLATE 50 MG/1
50 CAPSULE ORAL EVERY MORNING
Qty: 30 CAPSULE | Refills: 0 | Status: SHIPPED | OUTPATIENT
Start: 2025-08-19 | End: 2025-09-18

## 2025-08-20 RX ORDER — FLUTICASONE PROPIONATE AND SALMETEROL XINAFOATE 115; 21 UG/1; UG/1
2 AEROSOL, METERED RESPIRATORY (INHALATION) 2 TIMES DAILY
Qty: 12 G | Refills: 11 | Status: SHIPPED | OUTPATIENT
Start: 2025-08-20

## 2025-08-26 DIAGNOSIS — J45.20 MILD INTERMITTENT EXTRINSIC ASTHMA WITHOUT COMPLICATION (HHS-HCC): ICD-10-CM

## 2025-08-26 RX ORDER — MONTELUKAST SODIUM 10 MG/1
10 TABLET ORAL NIGHTLY
Qty: 30 TABLET | Refills: 2 | Status: SHIPPED | OUTPATIENT
Start: 2025-08-26

## 2025-09-03 ENCOUNTER — CLINICAL SUPPORT (OUTPATIENT)
Dept: PREADMISSION TESTING | Facility: HOSPITAL | Age: 30
End: 2025-09-03
Payer: COMMERCIAL

## 2025-09-03 RX ORDER — TRIAMCINOLONE ACETONIDE 1 MG/G
CREAM TOPICAL
COMMUNITY
Start: 2025-08-19 | End: 2025-09-03 | Stop reason: WASHOUT

## 2025-09-03 RX ORDER — ACETAMINOPHEN 325 MG/1
650 TABLET ORAL EVERY 6 HOURS PRN
COMMUNITY

## 2025-09-03 RX ORDER — ONDANSETRON 4 MG/1
TABLET, ORALLY DISINTEGRATING ORAL
COMMUNITY
Start: 2025-08-02

## 2025-09-03 RX ORDER — PREDNISONE 20 MG/1
TABLET ORAL
COMMUNITY
Start: 2025-08-30

## 2025-09-04 ENCOUNTER — PRE-ADMISSION TESTING (OUTPATIENT)
Dept: PREADMISSION TESTING | Facility: HOSPITAL | Age: 30
End: 2025-09-04
Payer: COMMERCIAL

## 2025-09-04 ENCOUNTER — PATIENT MESSAGE (OUTPATIENT)
Dept: OBSTETRICS AND GYNECOLOGY | Facility: CLINIC | Age: 30
End: 2025-09-04

## 2025-09-04 VITALS
OXYGEN SATURATION: 98 % | TEMPERATURE: 99.7 F | BODY MASS INDEX: 30.66 KG/M2 | WEIGHT: 195.33 LBS | SYSTOLIC BLOOD PRESSURE: 119 MMHG | RESPIRATION RATE: 16 BRPM | HEART RATE: 86 BPM | HEIGHT: 67 IN | DIASTOLIC BLOOD PRESSURE: 85 MMHG

## 2025-09-04 DIAGNOSIS — Z01.818 PREOP TESTING: Primary | ICD-10-CM

## 2025-09-04 LAB
ABO GROUP (TYPE) IN BLOOD: NORMAL
ANION GAP SERPL CALC-SCNC: 11 MMOL/L (ref 10–20)
ANTIBODY SCREEN: NORMAL
BASOPHILS # BLD AUTO: 0.09 X10*3/UL (ref 0–0.1)
BASOPHILS NFR BLD AUTO: 1 %
BUN SERPL-MCNC: 16 MG/DL (ref 6–23)
CALCIUM SERPL-MCNC: 9 MG/DL (ref 8.6–10.3)
CHLORIDE SERPL-SCNC: 101 MMOL/L (ref 98–107)
CO2 SERPL-SCNC: 29 MMOL/L (ref 21–32)
CREAT SERPL-MCNC: 0.83 MG/DL (ref 0.5–1.3)
EGFRCR SERPLBLD CKD-EPI 2021: >90 ML/MIN/1.73M*2
EOSINOPHIL # BLD AUTO: 0.06 X10*3/UL (ref 0–0.7)
EOSINOPHIL NFR BLD AUTO: 0.6 %
ERYTHROCYTE [DISTWIDTH] IN BLOOD BY AUTOMATED COUNT: 12.4 % (ref 11.5–14.5)
GLUCOSE SERPL-MCNC: 104 MG/DL (ref 74–99)
HCT VFR BLD AUTO: 41.9 % (ref 36–52)
HGB BLD-MCNC: 13.3 G/DL (ref 12–17.5)
IMM GRANULOCYTES # BLD AUTO: 0.03 X10*3/UL (ref 0–0.7)
IMM GRANULOCYTES NFR BLD AUTO: 0.3 % (ref 0–0.9)
LYMPHOCYTES # BLD AUTO: 2.94 X10*3/UL (ref 1.2–4.8)
LYMPHOCYTES NFR BLD AUTO: 31.7 %
MCH RBC QN AUTO: 29 PG (ref 26–34)
MCHC RBC AUTO-ENTMCNC: 31.7 G/DL (ref 32–36)
MCV RBC AUTO: 91 FL (ref 80–100)
MONOCYTES # BLD AUTO: 0.5 X10*3/UL (ref 0.1–1)
MONOCYTES NFR BLD AUTO: 5.4 %
NEUTROPHILS # BLD AUTO: 5.65 X10*3/UL (ref 1.2–7.7)
NEUTROPHILS NFR BLD AUTO: 61 %
NRBC BLD-RTO: 0 /100 WBCS (ref 0–0)
PLATELET # BLD AUTO: 280 X10*3/UL (ref 150–450)
POTASSIUM SERPL-SCNC: 3.5 MMOL/L (ref 3.5–5.3)
RBC # BLD AUTO: 4.59 X10*6/UL (ref 4–5.9)
RH FACTOR (ANTIGEN D): NORMAL
SODIUM SERPL-SCNC: 137 MMOL/L (ref 136–145)
WBC # BLD AUTO: 9.3 X10*3/UL (ref 4.4–11.3)

## 2025-09-04 PROCEDURE — 86901 BLOOD TYPING SEROLOGIC RH(D): CPT

## 2025-09-04 PROCEDURE — 85025 COMPLETE CBC W/AUTO DIFF WBC: CPT

## 2025-09-04 PROCEDURE — 86850 RBC ANTIBODY SCREEN: CPT

## 2025-09-04 PROCEDURE — 80048 BASIC METABOLIC PNL TOTAL CA: CPT

## 2025-09-04 PROCEDURE — 99204 OFFICE O/P NEW MOD 45 MIN: CPT

## 2025-09-04 PROCEDURE — 86900 BLOOD TYPING SEROLOGIC ABO: CPT

## 2025-09-04 ASSESSMENT — ENCOUNTER SYMPTOMS
NEUROLOGICAL NEGATIVE: 1
SINUS CONGESTION: 1
GASTROINTESTINAL NEGATIVE: 1
CARDIOVASCULAR NEGATIVE: 1
ENDOCRINE NEGATIVE: 1
BRUISES/BLEEDS EASILY: 1
NECK NEGATIVE: 1
MUSCULOSKELETAL NEGATIVE: 1
CONSTITUTIONAL NEGATIVE: 1
RESPIRATORY NEGATIVE: 1

## 2025-09-05 ENCOUNTER — TELEPHONE (OUTPATIENT)
Dept: OBSTETRICS AND GYNECOLOGY | Facility: CLINIC | Age: 30
End: 2025-09-05
Payer: COMMERCIAL

## 2025-10-07 ENCOUNTER — APPOINTMENT (OUTPATIENT)
Dept: OBSTETRICS AND GYNECOLOGY | Facility: CLINIC | Age: 30
End: 2025-10-07
Payer: COMMERCIAL

## (undated) DEVICE — PAD, SANITARY, OBSTETRICAL, W/ADHSV STRIP,11 IN,LF

## (undated) DEVICE — CATHETER, URETHRAL, ROBNEL, 16 FR, 16 IN, LF, RED

## (undated) DEVICE — DRESSING, NON-ADHERENT, TELFA, OUCHLESS, 3 X 8 IN, STERILE

## (undated) DEVICE — IRRIGATION SET, CYSTOSCOPY, F/CONSTANT/INTERMITTENT, 8 GTT/CC, 77 IN

## (undated) DEVICE — ELECTRODE, ELECTROSURGICAL, BALL TIP, 5 MM X 11 CM, LF

## (undated) DEVICE — PUNCH, BIOPSY, SKIN, 3 MM

## (undated) DEVICE — Device

## (undated) DEVICE — GLOVE, SURGICAL, PROTEXIS PI , 8.5, PF, LF

## (undated) DEVICE — ELECTRODE, ELECTROSURGICAL, UTAHLOOP, CUTTING, SAFE-T-GAUGE, 11 CM SHAFT, 12 X 20 MM, LF

## (undated) DEVICE — ELECTRODE, UTAH, LOOP, 10 X 10 MM, LF, YELLOW